# Patient Record
Sex: FEMALE | Race: WHITE | NOT HISPANIC OR LATINO | Employment: FULL TIME | URBAN - METROPOLITAN AREA
[De-identification: names, ages, dates, MRNs, and addresses within clinical notes are randomized per-mention and may not be internally consistent; named-entity substitution may affect disease eponyms.]

---

## 2017-01-03 ENCOUNTER — TRANSCRIBE ORDERS (OUTPATIENT)
Dept: LAB | Facility: HOSPITAL | Age: 47
End: 2017-01-03

## 2017-01-03 ENCOUNTER — APPOINTMENT (OUTPATIENT)
Dept: LAB | Facility: HOSPITAL | Age: 47
End: 2017-01-03
Payer: COMMERCIAL

## 2017-01-03 DIAGNOSIS — Z11.9 SCREENING EXAMINATION FOR UNSPECIFIED INFECTIOUS DISEASE: ICD-10-CM

## 2017-01-03 DIAGNOSIS — Z11.9 SCREENING EXAMINATION FOR UNSPECIFIED INFECTIOUS DISEASE: Primary | ICD-10-CM

## 2017-01-03 LAB — HCV AB SER QL: NORMAL

## 2017-01-03 PROCEDURE — 86803 HEPATITIS C AB TEST: CPT

## 2017-01-03 PROCEDURE — 87491 CHLMYD TRACH DNA AMP PROBE: CPT

## 2017-01-03 PROCEDURE — 86592 SYPHILIS TEST NON-TREP QUAL: CPT

## 2017-01-03 PROCEDURE — 87389 HIV-1 AG W/HIV-1&-2 AB AG IA: CPT

## 2017-01-03 PROCEDURE — 36415 COLL VENOUS BLD VENIPUNCTURE: CPT

## 2017-01-03 PROCEDURE — 87591 N.GONORRHOEAE DNA AMP PROB: CPT

## 2017-01-04 LAB
CHLAMYDIA DNA CVX QL NAA+PROBE: NORMAL
HIV 1+2 AB+HIV1 P24 AG SERPL QL IA: NORMAL
N GONORRHOEA DNA GENITAL QL NAA+PROBE: NORMAL
RPR SER QL: NORMAL

## 2017-03-28 ENCOUNTER — HOSPITAL ENCOUNTER (OUTPATIENT)
Dept: RADIOLOGY | Age: 47
Discharge: HOME | End: 2017-03-28
Attending: OBSTETRICS & GYNECOLOGY
Payer: COMMERCIAL

## 2017-03-28 DIAGNOSIS — Z12.31 ENCOUNTER FOR SCREENING MAMMOGRAM FOR MALIGNANT NEOPLASM OF BREAST: ICD-10-CM

## 2017-03-28 PROCEDURE — G0202 SCR MAMMO BI INCL CAD: HCPCS

## 2017-04-04 ENCOUNTER — HOSPITAL ENCOUNTER (OUTPATIENT)
Dept: MAMMOGRAPHY | Facility: CLINIC | Age: 47
Discharge: HOME | End: 2017-04-04
Payer: COMMERCIAL

## 2017-04-04 ENCOUNTER — ALLSCRIPTS OFFICE VISIT (OUTPATIENT)
Dept: OTHER | Facility: OTHER | Age: 47
End: 2017-04-04

## 2017-04-04 ENCOUNTER — HOSPITAL ENCOUNTER (OUTPATIENT)
Dept: ULTRASOUND IMAGING | Facility: CLINIC | Age: 47
Discharge: HOME | End: 2017-04-04
Payer: COMMERCIAL

## 2017-04-04 DIAGNOSIS — R92.8 OTHER ABNORMAL AND INCONCLUSIVE FINDINGS ON DIAGNOSTIC IMAGING OF BREAST: ICD-10-CM

## 2017-04-04 PROCEDURE — G0206 DX MAMMO INCL CAD UNI: HCPCS

## 2017-04-25 ENCOUNTER — ALLSCRIPTS OFFICE VISIT (OUTPATIENT)
Dept: OTHER | Facility: OTHER | Age: 47
End: 2017-04-25

## 2017-05-11 ENCOUNTER — ALLSCRIPTS OFFICE VISIT (OUTPATIENT)
Dept: OTHER | Facility: OTHER | Age: 47
End: 2017-05-11

## 2017-05-23 ENCOUNTER — ALLSCRIPTS OFFICE VISIT (OUTPATIENT)
Dept: OTHER | Facility: OTHER | Age: 47
End: 2017-05-23

## 2017-06-13 ENCOUNTER — ALLSCRIPTS OFFICE VISIT (OUTPATIENT)
Dept: OTHER | Facility: OTHER | Age: 47
End: 2017-06-13

## 2017-06-19 ENCOUNTER — ALLSCRIPTS OFFICE VISIT (OUTPATIENT)
Dept: OTHER | Facility: OTHER | Age: 47
End: 2017-06-19

## 2017-07-11 ENCOUNTER — ALLSCRIPTS OFFICE VISIT (OUTPATIENT)
Dept: OTHER | Facility: OTHER | Age: 47
End: 2017-07-11

## 2017-08-03 ENCOUNTER — ALLSCRIPTS OFFICE VISIT (OUTPATIENT)
Dept: OTHER | Facility: OTHER | Age: 47
End: 2017-08-03

## 2017-08-21 ENCOUNTER — ALLSCRIPTS OFFICE VISIT (OUTPATIENT)
Dept: OTHER | Facility: OTHER | Age: 47
End: 2017-08-21

## 2017-09-11 ENCOUNTER — ALLSCRIPTS OFFICE VISIT (OUTPATIENT)
Dept: OTHER | Facility: OTHER | Age: 47
End: 2017-09-11

## 2017-09-25 ENCOUNTER — ALLSCRIPTS OFFICE VISIT (OUTPATIENT)
Dept: OTHER | Facility: OTHER | Age: 47
End: 2017-09-25

## 2017-10-09 ENCOUNTER — ALLSCRIPTS OFFICE VISIT (OUTPATIENT)
Dept: OTHER | Facility: OTHER | Age: 47
End: 2017-10-09

## 2017-10-23 ENCOUNTER — ALLSCRIPTS OFFICE VISIT (OUTPATIENT)
Dept: OTHER | Facility: OTHER | Age: 47
End: 2017-10-23

## 2017-11-06 ENCOUNTER — ALLSCRIPTS OFFICE VISIT (OUTPATIENT)
Dept: OTHER | Facility: OTHER | Age: 47
End: 2017-11-06

## 2017-11-14 ENCOUNTER — ALLSCRIPTS OFFICE VISIT (OUTPATIENT)
Dept: OTHER | Facility: OTHER | Age: 47
End: 2017-11-14

## 2017-11-15 NOTE — PROGRESS NOTES
Assessment    1  Encounter for routine gynecological examination (V72 31) (Z01 419)   2  Visit for screening mammogram (V76 12) (Z12 31)    Plan  Encounter for routine gynecological examination    · Follow-up visit in 1 year Evaluation and Treatment  Follow-up  Status: Hold For -Scheduling  Requested for: [id number]   Ordered; For: Encounter for routine gynecological examination; Ordered By: Gerber Lugo Performed:  Due: [id number]  Herpes simplex infection    · ValACYclovir HCl - 500 MG Oral Tablet; TAKE 1 TABLET DAILY   Rx By: Gerber Lugo; Dispense: 90 Days ; #:90 Tablet; Refill: 3;Herpes simplex infection; TAYLOR = N; Verified Transmission to [facility]); Last Updated By: System, [employer]; 11/14/2017 11:15:23 AM  Visit for screening mammogram    · * MAMMO SCREENING BILATERAL W CAD; Status:Hold For - Scheduling,ExactDate,Retrospective By Protocol Authorization; Requested for:Mar 2018; Perform:[facility]; HUK:[id number]; Last Updated Brandon Mcbride; 11/14/2017 11:13:42 AM;Ordered;for screening mammogram; Ordered By:Pramod Fleming; Discussion/Summary  healthy adult female Currently, she eats a healthy diet  cervical cancer screening is current Breast cancer screening: monthly self breast exam was advised, mammogram is current and mammogram has been ordered  Advice and education were given regarding nutrition, aerobic exercise, calcium supplements, vitamin D supplements and sunscreen use  Normal GYN Exam  Stressed  ordered    Rx for Valtrex given for a year to continue the suppressive therapy  Smear Deferred  GYN Exam in 1 year  if any problems develop during the interim  The patient has the current Goals: 1914 [address]  The patent has the current Barriers: None  Patient is able to Self-Care  PATIENT EDUCATION RECORD She was given the following educational materials:  Ideas for a Healthy Life Style  The treatment plan was reviewed with the patient/guardian   The patient/guardian understands and agrees with the treatment plan     Self Referrals: No      Chief Complaint  ANNUAL EXAMhas no problem or concerns      History of Present Illness  HPI: Patient is s/p Hysterectomy    Doing well    No serious Vasomotor symptoms  bowel and bladder habits  any pelvic or abdominal pain    Only an occasional HSV outbreak  suppressive Rx    GYN HM, Adult Female [facility]: The patient is being seen for a gynecology evaluation  The last health maintenance visit was 1 year(s) ago  General Health: The patient's health since the last visit is described as good  She has regular dental visits  -- She denies vision problems  -- She denies hearing loss  Lifestyle:  She consumes a diverse and healthy diet  -- She does not have any weight concerns  -- She exercises regularly  -- She does not use tobacco -- She denies drug use  Reproductive health: the patient is perimenopausal    Screening: cancer screening reviewed and current  metabolic screening reviewed and current  risk screening reviewed and current  Review of Systems   Constitutional: No fever, no chills, feels well, no tiredness, no recent weight gain or loss  ENT: no ear ache, no loss of hearing, no nosebleeds or nasal discharge, no sore throat or hoarseness  Cardiovascular: no complaints of slow or fast heart rate, no chest pain, no palpitations, no leg claudication or lower extremity edema  Respiratory: no complaints of shortness of breath, no wheezing, no dyspnea on exertion, no orthopnea or PND  Breasts: no complaints of breast pain, breast lump or nipple discharge  Gastrointestinal: no complaints of abdominal pain, no constipation, no nausea or diarrhea, no vomiting, no bloody stools  Genitourinary: no complaints of dysuria, no incontinence, no pelvic pain, no dysmenorrhea, no vaginal discharge or abnormal vaginal bleeding    Musculoskeletal: no complaints of arthralgia, no myalgia, no joint swelling or stiffness, no limb pain or swelling  Integumentary: no complaints of skin rash or lesion, no itching or dry skin, no skin wounds  Neurological: no complaints of headache, no confusion, no numbness or tingling, no dizziness or fainting  Active Problems    1  Abnormal mammogram (793 80) (R92 8)   2  Adhesive capsulitis of right shoulder (726 0) (M75 01)   3  Depression, major, recurrent, moderate (296 32) (F33 1)   4  Encounter for routine gynecological examination (V72 31) (Z01 419)   5  Herpes simplex infection (054 9) (B00 9)   6  Pelvic pain in female (625 9) (R10 2)   7  Smear, vaginal, as part of routine gynecological examination (V76 47) (Z12 72)   8   Visit for screening mammogram (V76 12) (Z12 31)    Past Medical History     · History of Encounter for routine gynecological examination (V72 31) (Z01 419)   · History of Encounter for routine gynecological examination (V72 31) (Z01 419)   · History of Headache (784 0) (R51)   · History of Smear, vaginal, as part of routine gynecological examination (V76 47) (Z12 72)   · History of Uterine fibroid (218 9) (D25 9)   · History of Visit for screening mammogram (V76 12) (Z12 31)   · History of Visit for screening mammogram (V76 12) (Z12 31)    Surgical History   · History of Oral Surgery Tooth Extraction   · History of Salpingo-oophorectomy Right Side   · History of Tonsillectomy   · History of Total Abdominal Hysterectomy    Family History  Father    · Family history of Heart disease   · History of heart artery stent   · Family history of Pacemaker Placement  Paternal Grandmother    · Family history of Diabetes  Paternal Grandfather    · Family history of Heart disease  Maternal Great Grandfather    · Family history of Diabetes  Family History    · Family history of Diabetes   · Family history of Gallbladder disease   · Family history of Headache   · Family history of Hypertension   · Family history of Thyroid disease    Social History     · Feels safe at home   · Never a smoker   · Remote social alcohol use    Current Meds   1  Flonase SUSP; Therapy: (Recorded:14Nov2017) to Recorded   2  ValACYclovir HCl - 500 MG Oral Tablet; TAKE 1 TABLET DAILY; Therapy: [id number] to (Srikanth White)  Requested for: 31DQJ6496; Last Rx:08Nov2016 Ordered   3  Vitamin D TABS; Therapy: (Recorded:14Nov2017) to Recorded   4  ZyrTEC Allergy CAPS; Therapy: (Recorded:14Nov2017) to Recorded    Allergies  1  Bactrim TABS  2  No Known Environmental Allergies   3  No Known Food Allergies    Vitals   Recorded: 96ADP5612 88:22GC   Systolic 515   Diastolic 70   Height 5 ft 4 in   Weight 143 lb    BMI Calculated 24 55   BSA Calculated 1 7   LMP JAMIE       Physical Exam   Constitutional  General appearance: No acute distress, well appearing and well nourished  Neck  Neck: Normal, supple, trachea midline, no masses  Thyroid: Normal, no thyromegaly  Pulmonary  Respiratory effort: No increased work of breathing or signs of respiratory distress  Auscultation of lungs: Clear to auscultation  Cardiovascular  Auscultation of heart: Normal rate and rhythm, normal S1 and S2, no murmurs  Peripheral vascular exam: Normal pulses Throughout  Genitourinary  External genitalia: Normal and no lesions appreciated  Vagina: Normal, no lesions or dryness appreciated  Urethra: Normal    Urethral meatus: Normal    Bladder: Normal, soft, non-tender and no prolapse or masses appreciated  Cervix: Surgically absent  Uterus: Surgically absent  Adnexa/parametria: Normal, non-tender and no fullness or masses appreciated  Anus, perineum, and rectum: Normal sphincter tone, no masses, and no prolapse  Chest  Breasts: Normal and no dimpling or skin changes noted  Abdomen  Abdomen: Normal, non-tender, and no organomegaly noted  Liver and spleen: No hepatomegaly or splenomegaly  Examination for hernias: No hernias appreciated  Stool sample for occult blood: Negative     Lymphatic  Palpation of lymph nodes in neck, axillae, groin and/or other locations: No lymphadenopathy or masses noted  Skin  Skin and subcutaneous tissue: Normal skin turgor and no rashes     Palpation of skin and subcutaneous tissue: Normal    Psychiatric  Orientation to person, place, and time: Normal    Mood and affect: Normal        Provider Comments  Provider Comments:   Patient [facility] [place] [facility] at [facility]    5 nieces and nephewsat a Dental practice for 10 years in [facility] Appointments    Date/Time Provider Specialty Site   11/20/2017 08:00 AM Issa Aviles       Signatures   Electronically signed by : JUMANA Tobin ; Nov 14 2017 12:27PM EST                       (Author)

## 2017-11-20 ENCOUNTER — ALLSCRIPTS OFFICE VISIT (OUTPATIENT)
Dept: OTHER | Facility: OTHER | Age: 47
End: 2017-11-20

## 2017-12-18 ENCOUNTER — ALLSCRIPTS OFFICE VISIT (OUTPATIENT)
Dept: OTHER | Facility: OTHER | Age: 47
End: 2017-12-18

## 2018-01-04 ENCOUNTER — ALLSCRIPTS OFFICE VISIT (OUTPATIENT)
Dept: OTHER | Facility: OTHER | Age: 48
End: 2018-01-04

## 2018-01-09 NOTE — PSYCH
Progress Note  Psychotherapy Provided [facility]: Individual Psychotherapy 45 minutes provided today  Goals addressed in session:   Goals: 1  Linda presented as tearful today  She stated that it is her birthday and she is sad regarding the circumstances of her relationship  PT processing her emotions and trying to focus on the positives in her life  Discussing ways to cope with the status of her relationship and her boyfriend's ongoing silence  PT had considered trying to go and see him but has changed her mind  PT expressing her anger towards him for his lack of communication and willingness to try to resolve the issues  Encouraging her to continue to process her emotions  Giving supportive therapy  A- Progress - Continuing to process her emotions  P- Continue treatment       Pain Scale and Suicide Risk [facility]: On a scale of 0 to 10, the patient rates current pain at 3   Current suicide risk is low   Assessment    1   Depression, major, recurrent, moderate (296 32) (F33 1)    Signatures   Electronically signed by : Ирина Chiu LCSW; Jan 12 2016 11:41AM EST                       (Author)

## 2018-01-10 NOTE — PSYCH
Progress Note  Psychotherapy Provided [facility]: Individual Psychotherapy 45 minutes provided today  Goals addressed in session:   Goals: 3  D- Linda stated that she has been feeling worried about her new relationship  She shared that she feels that it is going well and she worries about something going wrong  Processing her emotions and discussing her specific concerns  Discussing the importance of communication and problem solving in relationships  Also continuing to work on building and maintaining boundaries in familial relationships  Giving supportive therapy  A- Progress - COntinuing to process her emotions  P- COntinue treatment       Pain Scale and Suicide Risk  [facility]: Current Pain Assessment: no pain   On a scale of 0 to 10, the patient rates current pain at 0   Behavioral Health Treatment Plan [facility]: Diagnosis and Treatment Plan explained to patient, patient relates understanding diagnosis and is agreeable to Treatment Plan  Assessment    1   Depression, major, recurrent, moderate (296 32) (F33 1)    Signatures   Electronically signed by : Jesusita Diaz LCSW; Nov 20 2017  1:25PM EST                       (Author)

## 2018-01-10 NOTE — PSYCH
Progress Note  Psychotherapy Provided [facility]: Individual Psychotherapy 45 minutes provided today  Goals addressed in session:   Goals: 3  D- Linda stated that she has been feeling very upbeat and positive  She shared that she has joined [employer] to start the dating process  She also shared accomplishments that she has made at work  Continuing to work on remaining positive and making choices that make her happy  Also continuing to process issues in her past that continue to affect her  Giving supportive therapy  A- Progress - Continuing to process her emotions and working on building self esteem   P- Continue treatment       Pain Scale and Suicide Risk [facility]: Current Pain Assessment: no pain   On a scale of 0 to 10, the patient rates current pain at 0   Behavioral Health Treatment Plan [facility]: Diagnosis and Treatment Plan explained to patient, patient relates understanding diagnosis and is agreeable to Treatment Plan  Assessment    1   Depression, major, recurrent, moderate (296 32) (F33 1)    Signatures   Electronically signed by : Prince Kiran LCSW; Nov 8 2016 12:48PM EST                       (Author)

## 2018-01-10 NOTE — PSYCH
Progress Note  Psychotherapy Provided [facility]: Individual Psychotherapy 45 minutes provided today  Goals addressed in session:   Goals: 2  D- Linda stated that she continues to struggle with her families behavior  She stated that they continue to interact with her ex-boyfriend and have been nasty towards her  Processing her emotions and discussing ways for her to communciate her emotions to them  Also discussing ways for her to maintain healthy boundaries Giving supportive therapy  A- progress - Continuing to process her emotions  P- Continue treatment       Pain Scale and Suicide Risk [facility]: Current Pain Assessment: no pain   On a scale of 0 to 10, the patient rates current pain at 0   Behavioral Health Treatment Plan [facility]: Diagnosis and Treatment Plan explained to patient, patient relates understanding diagnosis and is agreeable to Treatment Plan  Assessment    1   Depression, major, recurrent, moderate (296 32) (F33 1)    Signatures   Electronically signed by : Ирина Chiu LCSW; Apr 25 2017  9:57AM EST                       (Author)

## 2018-01-10 NOTE — PSYCH
Progress Note  Psychotherapy Provided [facility]: Individual Psychotherapy 45 minutes provided today  Goals addressed in session:   Goals: 2  D- Linda presented as upset and tearful  She stated that her father is having open heart surgery tomorrow and that she is having a difficult time coping with this  Processing her emotions and discussing ways for her to cope with the situation  ALSo discussing thoughts and feelings that she is experiencing and ways for her to process all of this  Giving supportive therapy  A- Progress - Continuing to process her emotions  P-Continue treatment       Pain Scale and Suicide Risk [facility]: Current Pain Assessment: no pain   On a scale of 0 to 10, the patient rates current pain at 0   Behavioral Health Treatment Plan [facility]: Diagnosis and Treatment Plan explained to patient, patient relates understanding diagnosis and is agreeable to Treatment Plan  Assessment    1   Depression, major, recurrent, moderate (296 32) (F33 1)    Signatures   Electronically signed by : Rebeka Nazario LCSW; Sep 11 2017  9:50AM EST                       (Author)

## 2018-01-10 NOTE — PSYCH
Treatment Plan Tracking        #3 Treatment Plan not completed within required time limits due to: Client presented with emotional/behavioral issues that required clinical intervention        Signatures   Electronically signed by : Romy Skinner LCSW; Nov 20 2017  1:26PM EST                       (Author)

## 2018-01-11 NOTE — PSYCH
Progress Note  Psychotherapy Provided [facility]: Individual Psychotherapy 45 minutes provided today  Goals addressed in session:   Goals: 3  D- Linda stated that she continues to struggle in her relationships with her family members  She also stated that she is ending the relationship with the man that she has been dating die to his narcissistic behaviors  Processing her emotions and discussing ways to communicate her feelings to her family members  ALso discussing ways to seek and maintain healthy relationships in her life  Giving supportive therapy  A- Progress - Continuing to work on maintaining healthy relationships  P-Continue treatment       Pain Scale and Suicide Risk [facility]: Current Pain Assessment: no pain   On a scale of 0 to 10, the patient rates current pain at 0   Behavioral Health Treatment Plan [employer]: Diagnosis and Treatment Plan explained to patient, patient relates understanding diagnosis and is agreeable to Treatment Plan  Assessment    1   Depression, major, recurrent, moderate (296 32) (F33 1)    Signatures   Electronically signed by : Antonia Zaldivar LCSW; May 23 2017 12:42PM EST                       (Author)

## 2018-01-11 NOTE — PSYCH
Treatment Plan Tracking        #3 Treatment Plan not completed within required time limits due to: Client presented with emotional/behavioral issues that required clinical intervention        Signatures   Electronically signed by : Sisi Dawson LCSW; Sep 11 2017  9:50AM EST                       (Author)

## 2018-01-11 NOTE — PSYCH
Treatment Plan Tracking        #3 Treatment Plan not completed within required time limits due to: Client presented with emotional/behavioral issues that required clinical intervention        Signatures   Electronically signed by : Ileana Scott LCSW; Oct  9 2017  2:41PM EST                       (Author)

## 2018-01-11 NOTE — PSYCH
Progress Note  Psychotherapy Provided [facility]: Individual Psychotherapy 45 minutes provided today  Goals addressed in session:   Goals: 3  D- Linda stated that she continues to struggle in her relationships with her family members  She stated that her sisters continue to act sarcastic towards her and question her decisions and life choices  In addition, they continue to spend time with her ex-boyfriend and make comments regarding this  Processing her emotions and continuing to discuss ways to set effective boundaries as well as healthy ways to respond to their comments  Giving supportive therapy  A- progress - Continuing to process her emotions  P- Continue treatment       Pain Scale and Suicide Risk [facility]: Current Pain Assessment: no pain   On a scale of 0 to 10, the patient rates current pain at 0   Behavioral Health Treatment Plan [facility]: Diagnosis and Treatment Plan explained to patient, patient relates understanding diagnosis and is agreeable to Treatment Plan  Assessment    1   Depression, major, recurrent, moderate (296 32) (F33 1)    Signatures   Electronically signed by : Arpan Caban LCSW; May 16 2017  9:58AM EST                       (Author)

## 2018-01-11 NOTE — PSYCH
Treatment Plan Tracking        #3 Treatment Plan not completed within required time limits due to: Client presented with emotional/behavioral issues that required clinical intervention        Signatures   Electronically signed by : Emmett Franklin LCSW; May 23 2017 12:43PM EST                       (Author)

## 2018-01-11 NOTE — PSYCH
Treatment Plan Tracking        #3 Treatment Plan not completed within required time limits due to: Client presented with emotional/behavioral issues that required clinical intervention        Signatures   Electronically signed by : Shweta Pompa LCSW; Sep 25 2017  9:55AM EST                       (Author)

## 2018-01-11 NOTE — PSYCH
Progress Note  Psychotherapy Provided [facility]: Individual Psychotherapy 45 minutes provided today  Goals addressed in session:   Goals: 3  D- Linda stated that she just met someone new that she is interested in having a relationship with  Discussing her relationship goals and what she feels would be healthy about this potential relationship  Also discussing her family and the boundaries that she feels are healthy with them and her ex-boyfriend  Giving supportive therapy  A- PRogress - Continuing to process her emotions  P-Continue treatment       Pain Scale and Suicide Risk [facility]: Current Pain Assessment: no pain   On a scale of 0 to 10, the patient rates current pain at 0   Behavioral Health Treatment Plan [facility]: Diagnosis and Treatment Plan explained to patient, patient relates understanding diagnosis and is agreeable to Treatment Plan  Assessment    1   Depression, major, recurrent, moderate (296 32) (F33 1)    Signatures   Electronically signed by : Zheng Henao LCSW; Oct 23 2017  4:46PM EST                       (Author)

## 2018-01-11 NOTE — PSYCH
Progress Note  Psychotherapy Provided [facility]: Individual Psychotherapy 45 minutes provided today  Goals addressed in session:   Goals: 1  D- Linda stated that she is feeling better this week  She celebrated her birthday over the weekend with her family  She shared that her boyfriend did text her briefly for her birthday, but she had no further contact  PT continuing to process her emotions regarding the relationship  Discussing her feelings as well as concerns regarding her boyfriends depression and anxiety  Continuing to work on effective coping and self care  PT continues to utilize self help books as well as her support system  Giving supportive therapy  A- Progress - Continuing to utilize her support system  P- Continue treatment       Pain Scale and Suicide Risk [facility]: On a scale of 0 to 10, the patient rates current pain at 3   Current suicide risk is low   Behavioral Health Treatment Plan [facility]: Diagnosis and Treatment Plan explained to patient, patient relates understanding diagnosis and is agreeable to Treatment Plan  Assessment    1   Depression, major, recurrent, moderate (296 32) (F33 1)    Signatures   Electronically signed by : Rebeka Nazario LCSW; Jan 19 2016 10:19AM EST                       (Author)

## 2018-01-11 NOTE — PSYCH
Progress Note  Psychotherapy Provided [facility]: Individual Psychotherapy 45 minutes provided today  Goals addressed in session:   Goals: 1  D- Linda stated that she has onelia dealing with a lot of emotions  Processing her emotions and discussing the source  PT continuing to process the breakup with her boyfriend  She also stated that her fibromyalgia pain has increased  PT stated that she continues to utilize spirituality and prayer to cope as well as her support system  Giving supportive therapy  A- Progress - Continuing to process her emotions  P- COntinue treatment       Pain Scale and Suicide Risk [facility]: Current Pain Assessment: moderate to severe   On a scale of 0 to 10, the patient rates current pain at 4   Current suicide risk is low   Behavioral Health Treatment Plan [address] [place] [address] [address]: Diagnosis and Treatment Plan explained to patient, patient relates understanding diagnosis and is agreeable to Treatment Plan  Assessment    1   Depression, major, recurrent, moderate (296 32) (F33 1)    Signatures   Electronically signed by : Alexey Terrell LCSW; Mar 31 2016  9:22AM EST                       (Author)

## 2018-01-12 NOTE — PSYCH
Progress Note  Psychotherapy Provided [facility]: Individual Psychotherapy 45 minutes provided today  Goals addressed in session:   Goals: 2 & 3  D- Linda stated that she continues to struggle with the fact that she has had difficulty finding healthy relationships  In addition, she discussed continued issues with her family members  Processing her emotions and continuing to work on boundary setting in relationships as well as building trust  Giving supportive therpay  A- PRogress - Continuing to process her emotions  'P-Continue treatment         Pain Scale and Suicide Risk  [facility]: Current Pain Assessment: no pain   Behavioral Health Treatment Plan [facility]: Diagnosis and Treatment Plan explained to patient, patient relates understanding diagnosis and is agreeable to Treatment Plan  Assessment    1   Depression, major, recurrent, moderate (296 32) (F33 1)    Signatures   Electronically signed by : Xiomara Dunn LCSW; Aug  3 2017 10:36AM EST                       (Author)

## 2018-01-12 NOTE — PSYCH
Treatment Plan Tracking        #3 Treatment Plan not completed within required time limits due to: Client presented with emotional/behavioral issues that required clinical intervention        Signatures   Electronically signed by : Romy Skinner LCSW; May 16 2017  9:58AM EST                       (Author)

## 2018-01-12 NOTE — PSYCH
Progress Note  Psychotherapy Provided [facility]: Individual Psychotherapy 45 minutes provided today  Goals addressed in session:   Goals: 3  D- Linda stated that she continues to struggle with her relationships with her family memebers  She shared that she continues to feel judged by them for her life choices  Continuing to work on boundary setting her familial relationships as well as being true to herself with her life choices  Giving supportive therpay  A- PRogress - Continuing to process her emotions  P- Continue treatmetn       Pain Scale and Suicide Risk [facility]: Current Pain Assessment: no pain   On a scale of 0 to 10, the patient rates current pain at 0   Behavioral Health Treatment Plan [facility]: Diagnosis and Treatment Plan explained to patient, patient relates understanding diagnosis and is agreeable to Treatment Plan  Assessment    1   Depression, major, recurrent, moderate (296 32) (F33 1)    Signatures   Electronically signed by : Renny Vazquez LCSW; Aug 22 2017 11:47AM EST                       (Author)

## 2018-01-12 NOTE — PSYCH
Treatment Plan Tracking        #3 Treatment Plan not completed within required time limits due to: Client presented with emotional/behavioral issues that required clinical intervention        Signatures   Electronically signed by : Antonia Zaldivar LCSW; Jun 19 2017 11:52AM EST                       (Author)

## 2018-01-12 NOTE — PSYCH
Progress Note  Psychotherapy Provided [facility]: Individual Psychotherapy 45 minutes provided today  Goals addressed in session:   Goals: 3  D- Linda stated that she recently moved into her new home  She stated that she is very happy there  Discussing boundary issues with her family members  She shared that they are participating in outings with her ex-boyfriend  Processing her emotions and discussing ways for her to express her feelings to them  Linda also discussing stress that she is experiencing in the workplace and ways to decrease her stress level  Giving supportive therapy  A- progress- COntinuing to process her emotions  P- Continue treatment       Pain Scale and Suicide Risk [facility]: Current Pain Assessment: no pain   On a scale of 0 to 10, the patient rates current pain at 0   Behavioral Health Treatment Plan [facility]: Diagnosis and Treatment Plan explained to patient, patient relates understanding diagnosis and is agreeable to Treatment Plan  Assessment    1   Depression, major, recurrent, moderate (296 32) (F33 1)    Signatures   Electronically signed by : Romy Skinner LCSW; Oct  3 2016 11:22AM EST                       (Author)

## 2018-01-12 NOTE — PSYCH
Progress Note  Psychotherapy Provided [facility]: Individual Psychotherapy 45 minutes provided today  Goals addressed in session:   Goals: 2 & 3  D- Linda stated that she continues to struggle with her family's behavior  She stated that they continue to be judgemental of her and her life choices  The also continue to spend time with her ex-boyfriend and side with him on issues  Processing her emotions and discussing ways to set effective boundaries with them  Also discussing her ex-boyfriend's request to meet with her and how to navigate the interaction  Giving supportive therapy  A- Progress - Continuing to work on boundary setting in relationships  P-Continue treatment       Pain Scale and Suicide Risk [facility]: Current Pain Assessment: no pain   On a scale of 0 to 10, the patient rates current pain at 0   Behavioral Health Treatment Plan [facility]: Diagnosis and Treatment Plan explained to patient, patient relates understanding diagnosis and is agreeable to Treatment Plan  Assessment    1   Depression, major, recurrent, moderate (296 32) (F33 1)    Signatures   Electronically signed by : Ирина Chiu LCSW; Jun 19 2017 11:52AM EST                       (Author)

## 2018-01-12 NOTE — PSYCH
Progress Note  Psychotherapy Provided [facility]: Individual Psychotherapy 45 minutes provided today  Goals addressed in session:   Goals: 2  DSerafin Mckeon stated that she continues to struggle with her relationships with her family and her ex-boyfriend's continued interference in her relationships with her family members  Processing her emotions and discussing ways for her to communicate her feelings to her family as well as set boundaries  ALso discussing ways to respond to the texts from her ex-boyfriend regarding this matter  Giving supportive therapy  A- PRogress - Continuing to process her emotions  P - Continue treatment       Pain Scale and Suicide Risk [facility]: Current Pain Assessment: no pain   On a scale of 0 to 10, the patient rates current pain at 0   Behavioral Health Treatment Plan [facility]: Diagnosis and Treatment Plan explained to patient, patient relates understanding diagnosis and is agreeable to Treatment Plan  Assessment    1   Depression, major, recurrent, moderate (296 32) (F33 1)    Signatures   Electronically signed by : Eder Toney LCSW; Jun 13 2017  5:10PM EST                       (Author)

## 2018-01-12 NOTE — PSYCH
Treatment Plan Tracking        #3 Treatment Plan not completed within required time limits due to: Client presented with emotional/behavioral issues that required clinical intervention        Signatures   Electronically signed by : Ирина Chiu LCSW; Aug 22 2017 11:48AM EST                       (Author)

## 2018-01-13 VITALS
WEIGHT: 143 LBS | HEIGHT: 64 IN | SYSTOLIC BLOOD PRESSURE: 102 MMHG | BODY MASS INDEX: 24.41 KG/M2 | DIASTOLIC BLOOD PRESSURE: 70 MMHG

## 2018-01-13 NOTE — PSYCH
Progress Note  Psychotherapy Provided [facility]: Individual Psychotherapy 45 minutes provided today  Goals addressed in session:   Goals: 1  D- Linda stated that she had communicated via text message with her boyfriend attempting to schedule a time for her to retrieve her things from his house  PT discussing his response and expressing her frustration  Processing her emotions also discussing her response  PT choosing to let go of the relationship due to its unhealthy nature  Continuing to focus on her use of coping mechanisms and positive thought  PT also continuing to focus on her valdez as positive motivation  Giving supportive therapy  A- Progress - Continuing to utilize positive thought  P- Continue treatment       Pain Scale and Suicide Risk [facility]: Current Pain Assessment: no pain   Current suicide risk is low   Behavioral Health Treatment Plan [facility]: Diagnosis and Treatment Plan explained to patient, patient relates understanding diagnosis and is agreeable to Treatment Plan  Assessment    1   Depression, major, recurrent, moderate (296 32) (F33 1)    Signatures   Electronically signed by : Rebeka Nazario LCSW; Mar 17 2016  2:24PM EST                       (Author)

## 2018-01-13 NOTE — PSYCH
Treatment Plan Tracking        #3 Treatment Plan not completed within required time limits due to: Client presented with emotional/behavioral issues that required clinical intervention        Signatures   Electronically signed by : Dion Samayoa LCSW; Apr 25 2017  9:58AM EST                       (Author)

## 2018-01-14 NOTE — PSYCH
Progress Note  Psychotherapy Provided [facility]: Individual Psychotherapy 45 minutes provided today  Goals addressed in session:   Goals: 1  D- Linda stated that there have many recent changes in her life  She shared that she has made the decision to sell her home and move  She also shared that her ex-boyfriend invited her nephews to watch a hockey game  Processing her emotions and discussing her feelings regarding his actions  PT feels that it was an attempt to gain a reaction from her  PT stated that she feels the relationship was unhealthy and that she is ready to move forward with her life  Discussing the positive changes that she has made  Giving supportive therapy  A- Progress - Continuing to process her emotions  P- Continue treatment       Pain Scale and Suicide Risk [facility]: On a scale of 0 to 10, the patient rates current pain at 3   Behavioral Health Treatment Plan [facility]: Diagnosis and Treatment Plan explained to patient, patient relates understanding diagnosis and is agreeable to Treatment Plan  Assessment    1   Depression, major, recurrent, moderate (296 32) (F33 1)    Signatures   Electronically signed by : Sultana Carrasco LCSW; May 19 2016  6:25PM EST                       (Author)

## 2018-01-14 NOTE — PSYCH
Treatment Plan Tracking        #3 Treatment Plan not completed within required time limits due to: Client presented with emotional/behavioral issues that required clinical intervention        Signatures   Electronically signed by : Sisi Dawson LCSW; Oct 23 2017  4:47PM EST                       (Author)

## 2018-01-15 NOTE — PSYCH
Treatment Plan Tracking        #3 Treatment Plan not completed within required time limits due to: Client presented with emotional/behavioral issues that required clinical intervention        Signatures   Electronically signed by : Erwin Lemus LCSW; Aug  3 2017 10:37AM EST                       (Author)

## 2018-01-15 NOTE — PSYCH
Progress Note  Psychotherapy Provided [facility]: Individual Psychotherapy 45 minutes provided today  Goals addressed in session:   Goals: 1  D- Linda stated that she continues to struggle with depression  She shared that her father's surgery went will and he is continuing to recover  She stated that she feels unsettled and isn't sure why  PRocessing her emotions and discussing potential triggers  Linda identifying that her ex-boyfriend's contact with her family is upsetting  Discussing ways for her to cope with this and be able to distance herself from the situation  Giving supportive therapy  A- Progress- Continuing to process her emotions  P-Continue treatment       Pain Scale and Suicide Risk [facility]: Current Pain Assessment: no pain   On a scale of 0 to 10, the patient rates current pain at 0   Behavioral Health Treatment Plan [facility]: Diagnosis and Treatment Plan explained to patient, patient relates understanding diagnosis and is agreeable to Treatment Plan  Assessment    1   Depression, major, recurrent, moderate (296 32) (F33 1)    Signatures   Electronically signed by : Polo Al LCSW; Sep 25 2017  9:55AM EST                       (Author)

## 2018-01-15 NOTE — PSYCH
Progress Note  Psychotherapy Provided [facility]: Individual Psychotherapy 45 minutes provided today  Goals addressed in session:   Goals: 1, 2 & 3  D- Linda stated that she feels that she is doing very well  She stated that she continues to search for a new home and has recovered well from her shoulder surgery  Discussing her healing journey from her relationship  Focusing on her ability to not be reactive to situations as well as to embrace who she is  Reviewing and revising treatment plan  Giving supportive therapy  A- Progress - Continuing to process her emotions  P- Continue treatment       Pain Scale and Suicide Risk [facility]: On a scale of 0 to 10, the patient rates current pain at 1   Behavioral Health Treatment Plan [facility]: Diagnosis and Treatment Plan explained to patient, patient relates understanding diagnosis and is agreeable to Treatment Plan  Assessment    1   Depression, major, recurrent, moderate (296 32) (F33 1)    Signatures   Electronically signed by : Polo Al LCSW; Jul 14 2016 10:08AM EST                       (Author)

## 2018-01-15 NOTE — PSYCH
Progress Note  Psychotherapy Provided [facility]: Individual Psychotherapy 45 minutes provided today  Goals addressed in session:   Goals: 2 & 3  D- Linda returned to treatment after a four month hiatus  She stated that she had been doing well, but is now feeling overwhelmed by issues with her family and ex-boyfriend  Discussing the issues and ways to set boundaries with her family  Also discussing ways to set boundaries with her ex-boyfriend  Linda also stated that they found an abnormality on her mammogram which will be followed up on today causing additional stress for her  Giving supportive therapy  A- progress - Continuing to process her emotions  P- Continue treatment       Pain Scale and Suicide Risk [facility]: Current Pain Assessment: no pain   On a scale of 0 to 10, the patient rates current pain at 0   Behavioral Health Treatment Plan [facility]: Diagnosis and Treatment Plan explained to patient, patient relates understanding diagnosis and is agreeable to Treatment Plan  Assessment    1   Depression, major, recurrent, moderate (296 32) (F33 1)    Signatures   Electronically signed by : Dion Samayoa LCSW; Apr 4 2017  9:42AM EST                       (Author)

## 2018-01-15 NOTE — PSYCH
Treatment Plan Tracking        #3 Treatment Plan not completed within required time limits due to: Client presented with emotional/behavioral issues that required clinical intervention        Signatures   Electronically signed by : Erwin Lemus LCSW; Jun 13 2017  5:11PM EST                       (Author)

## 2018-01-15 NOTE — PSYCH
Progress Note  Psychotherapy Provided [facility]: Individual Psychotherapy 45 minutes provided today  Goals addressed in session:   Goals: 2 & 3  d- Linda stated that she continues to do well  she shared that she continues to work on having patience in all situations in her life  she shared that she continues to look for houses and is enjoying the process  She further shared that her ex-boyfriend sent gifts to her nephew for his birthday  Processing her emotions regarding this and discussing how it affected her  Continuing to work on moving forward with her life  Giving supportive therapy  A- progress- Continuing to process her emotions  P- Continue treatment       Pain Scale and Suicide Risk [facility]: Current Pain Assessment: no pain   On a scale of 0 to 10, the patient rates current pain at 0   Behavioral Health Treatment Plan [facility]: Diagnosis and Treatment Plan explained to patient, patient relates understanding diagnosis and is agreeable to Treatment Plan  Assessment    1   Depression, major, recurrent, moderate (296 32) (F33 1)    Signatures   Electronically signed by : Romy Skinner LCSW; Aug  4 2016  3:14PM EST                       (Author)

## 2018-01-16 NOTE — PSYCH
Progress Note  Psychotherapy Provided [facility]: Individual Psychotherapy 45 minutes provided today  Goals addressed in session:   Goals: 3  D- Linda stated that she continues to struggle in her relationships with her family members due to their judgment of her as well as their continued contact with her ex-boyfriend  Processing her emotions and discussing ways for her to st and maintain healthy boundaries with her family members  Also discussing her re-entrance into the dating world and discussing what she is seeking in a relationship  Giving supportive therapy  A- progress - COntinuing to process her emotions  P- COntinue treatment       Pain Scale and Suicide Risk [facility]: Current Pain Assessment: no pain   On a scale of 0 to 10, the patient rates current pain at 0   Behavioral Health Treatment Plan [facility]: Diagnosis and Treatment Plan explained to patient, patient relates understanding diagnosis and is agreeable to Treatment Plan  Assessment    1   Depression, major, recurrent, moderate (296 32) (F33 1)    Signatures   Electronically signed by : Zheng Henao LCSW; Oct  9 2017  2:40PM EST                       (Author)

## 2018-01-16 NOTE — PSYCH
Progress Note  Psychotherapy Provided [facility]: Individual Psychotherapy 45 minutes provided today  Goals addressed in session:   Goals: 1  D- Linda stated that she continues to do well  Stated that she still has difficult days at times when she misses her boyfriend  PT stated that she continues to rely on her valdez as well as her support system  Continuing to work on use of positive thought and increasing communication in relationships  PT also stated that she continues to follow the weight watchers plan as well as doing yoga as a form of self care  Giving positive feedback and supportive therapy  A- Progress - Continuing to increase self care  P- Continue treatment       Pain Scale and Suicide Risk [facility]: Current Pain Assessment: no pain   Current suicide risk is low   Behavioral Health Treatment Plan [facility]: Diagnosis and Treatment Plan explained to patient, patient relates understanding diagnosis and is agreeable to Treatment Plan  Assessment    1   Depression, major, recurrent, moderate (296 32) (F33 1)    Signatures   Electronically signed by : Camden Nguyen LCSW; Apr 21 2016 11:37AM EST                       (Author)

## 2018-01-16 NOTE — PSYCH
Date of Initial Treatment Plan: 2/2015  Date of Current Treatment Plan: 7/14/16  Strengths/Personal Resources for Self Care: Embracing my valdez, strong, good decision making, healthy relationships, looking at the big picture  Diagnosis:   Axis I: MDD   Axis II: Deferred   Axis III: Shoulder issues     Current Challenges/Problems/Needs: Letting anxiety take over  Being reactive  Not being what other people want  Long Term Goals:   Be able to roll with things   Target Date: 11/14/16      Not be reactive   Target Date: 11/14/16      Be myself and love myself for who I am   Target Date: 11/14/16      Short Term Objectives:   Goal 1:   Count to ten  Sleep on it  Don't make snap decisions  Allow thing to work out  Use patience  Target Date: 11/14/16      Goal 2:   Let things naturally happen  Don't try to orchestrate  Target Date: 11/14/16      Goal 3:   Don't settle for something or someone I don't want in my life  Don't rationalize    Target Date: 11/14/16      GOAL 1: Modality: Individual 2 x per month Target Date: 11/14/16         GOAL 2: Modality: Individual 2 x per month Target Date: 11/14/16                The first scheduled review date is 11/14/16  The expected length of service is Unknown  Patient Signature: _________________________________ Date/Time: ______________        1  Adhesive capsulitis of right shoulder (726 0) (M75 01)   2  Depression, major, recurrent, moderate (296 32) (F33 1)   3  Encounter for routine gynecological examination (V72 31) (Z01 419)   4  Herpes simplex infection (054 9) (B00 9)   5  Pelvic pain in female (625 9) (R10 2)   6  Smear, vaginal, as part of routine gynecological examination (V76 47) (Z12 72)   7   Visit for screening mammogram (P95 31) (Z12 31)     Electronically signed by : Sammy Mitchell LCSW; Jul 14 2016  8:59AM EST                       (Author)

## 2018-01-16 NOTE — PSYCH
Treatment Plan Tracking        #3 Treatment Plan not completed within required time limits due to: Client presented with emotional/behavioral issues that required clinical intervention        Signatures   Electronically signed by : Norah Lilly LCSW; Nov 6 2017  2:21PM EST                       (Author)

## 2018-01-16 NOTE — PSYCH
Progress Note  Psychotherapy Provided [facility]: Individual Psychotherapy 45 minutes provided today  Goals addressed in session:   Goals: 1  D- Linda stated that she feels overwhelmed at the moment due to her impending move  She continues to work towards [employer] of her home and has experienced stress in terms of the repairs that need to be completed  Discussing ways to reduce her stress  PT also continuing to process the loss of her relationship  In addition PT is experiencing pain due to a shoulder injury that she will be repairing through surgery  Giving supportive therapy  A- Progress - Continuing to process her emotions  P- Continue treatment       Pain Scale and Suicide Risk [facility]: On a scale of 0 to 10, the patient rates current pain at 5   Behavioral Health Treatment Plan [facility]: Diagnosis and Treatment Plan explained to patient, patient relates understanding diagnosis and is agreeable to Treatment Plan  Assessment    1   Depression, major, recurrent, moderate (296 32) (F33 1)    Signatures   Electronically signed by : Josh Patton LCSW; Jun 9 2016  8:39AM EST                       (Author)

## 2018-01-16 NOTE — PSYCH
Progress Note  Psychotherapy Provided [facility]: Individual Psychotherapy 45 minutes provided today  Goals addressed in session:   Goals: 3  D- Linda stated that she continues to move forward with her new relationship  Continuing to work on ways to increase and maintain healthy communication  Also continuing to work on ways to maintain healthy boundaries with her family  Giving supportive therapy  A- Progress - Continuing to process her emotions  P-Continue treatment       Pain Scale and Suicide Risk [facility]: Current Pain Assessment: no pain   On a scale of 0 to 10, the patient rates current pain at 0   Behavioral Health Treatment Plan [facility]: Diagnosis and Treatment Plan explained to patient, patient relates understanding diagnosis and is agreeable to Treatment Plan  Assessment    1   Depression, major, recurrent, moderate (296 32) (F33 1)    Signatures   Electronically signed by : Gurpreet Zamarripa LCSW; Nov 6 2017  2:14PM EST                       (Author)

## 2018-01-17 NOTE — PSYCH
Treatment Plan Tracking        #3 Treatment Plan not completed within required time limits due to: Client presented with emotional/behavioral issues that required clinical intervention  , Other: Had not bee in treatment for four months        Signatures   Electronically signed by : Tomasa Richards LCSW; Apr 4 2017  9:44AM EST                       (Author)

## 2018-01-17 NOTE — PSYCH
Progress Note  Psychotherapy Provided [facility]: Individual Psychotherapy 45 minutes provided today  Goals addressed in session:   Goals: 3  D- Linda stated that she has continued to focus on not being reactionary and maintaining peace in her life  She shared that her family went to a football game with her ex-boyfriend yesterday  Processing her emotions and discussing her thoughts and feelings regarding the relationship and his continued involvement with her family  Linda discussing her recent dating experience and ways that she has been maintaining healthy boundaries with her family  Continuing to work on use of positive thought  Giving supportive therapy  A- progress - Continuing to utilize positive thought  P- Continue treatment       Pain Scale and Suicide Risk [facility]: Current Pain Assessment: no pain   On a scale of 0 to 10, the patient rates current pain at 0   Behavioral Health Treatment Plan [facility]: Diagnosis and Treatment Plan explained to patient, patient relates understanding diagnosis and is agreeable to Treatment Plan  Assessment    1   Depression, major, recurrent, moderate (296 32) (F33 1)    Signatures   Electronically signed by : Rebeka Nazario LCSW; Dec 12 2016 10:43AM EST                       (Author)

## 2018-01-17 NOTE — PSYCH
Progress Note  Psychotherapy Provided [facility]: Individual Psychotherapy 45 minutes provided today  Goals addressed in session:   Goals: 1  D- Linda stated that she continues to do well in terms of use of positive thought as well as occupying her time with positive activities  PT continues to process her emotions in terms of her relationship with Mg  She shared that she misses him and still loves him but is wiling to continue to give him the space that he needs in order to get well  PT continues to work on utilizing prayer and spirituality to guide her during this difficult time  Continuing to work on decreasing negative thought and anger regarding past events  Giving supportive therapy  A- Progress - Continuing to process her emotions  P- Continue treatment       Pain Scale and Suicide Risk [facility]: Current Pain Assessment: no pain   Current suicide risk is low   Behavioral Health Treatment Plan [facility]: Diagnosis and Treatment Plan explained to patient, patient relates understanding diagnosis and is agreeable to Treatment Plan  Assessment    1   Depression, major, recurrent, moderate (296 32) (F33 1)    Signatures   Electronically signed by : Marie Wilson LCSW; Feb 5 2016 11:35AM EST                       (Author)
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
## 2018-01-17 NOTE — PSYCH
Progress Note  Psychotherapy Provided [facility]: Individual Psychotherapy 45 minutes provided today  Goals addressed in session:   Goals: 1  D- Linda stated that she continues to try to move forward and be positive in her life  Sharing that she struggled on Virk's Day due to missing her boyfriend  Continuing to process her emotions and discussing ways to be able to move forward  PT feeling that she wants to be able to help him, but is uncertain how to proceed  Discussing maintaining and respecting the boundaries he has set even if she doesn't understand them  Continuing to work on use of effective coping mechanisms  Giving supportive therapy  A- Progress - Continuing to process her emotions  P- Continue treatment       Pain Scale and Suicide Risk [facility]: Current Pain Assessment: no pain   Current suicide risk is low   Behavioral Health Treatment Plan [address] [place] [address] [address]: Diagnosis and Treatment Plan explained to patient, patient relates understanding diagnosis and is agreeable to Treatment Plan  Assessment    1   Depression, major, recurrent, moderate (296 32) (F33 1)    Signatures   Electronically signed by : Mahin Rocha LCSW; Feb 18 2016 11:23AM EST                       (Author)
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
## 2018-01-18 NOTE — PSYCH
Treatment Plan Tracking    #1 Treatment Plan not completed within required time limits due to: Client presented with emotional/behavioral issues that required clinical intervention            Signatures   Electronically signed by : Sultana Carrasco LCSW; Apr 21 2016 11:38AM EST                       (Author)

## 2018-01-22 ENCOUNTER — ALLSCRIPTS OFFICE VISIT (OUTPATIENT)
Dept: OTHER | Facility: OTHER | Age: 48
End: 2018-01-22

## 2018-01-23 NOTE — PSYCH
Treatment Plan Tracking        #3 Treatment Plan not completed within required time limits due to: Client presented with emotional/behavioral issues that required clinical intervention        Signatures   Electronically signed by : Tommy Delcid LCSW; Dec 18 2017 10:00AM EST                       (Author)

## 2018-01-23 NOTE — PSYCH
Progress Note  Psychotherapy Provided [facility]: Individual Psychotherapy 45 minutes provided today  Goals addressed in session:   Goals: 3  D- Linda stated that she is upset and frustrated with her family  She shared that her sister has been communicating regularly with her ex-boyfriend and also recently spent time with him and stated that he would begin attending family functions  Processing her emotions and discussing ways for her to cope with the situation as well as communicate the inappropriate nature of this to her family members  Also discussing forming and maintaining healthy relationships in her life  Giving supportive therapy  A- Progress - Continuing to process her emotions  p-Continue treatment       Pain Scale and Suicide Risk [facility]: Current Pain Assessment: no pain   Behavioral Health Treatment Plan [facility]: Diagnosis and Treatment Plan explained to patient, patient relates understanding diagnosis and is agreeable to Treatment Plan  Assessment    1   Depression, major, recurrent, moderate (296 32) (F33 1)    Signatures   Electronically signed by : Ирина Chiu LCSW; Dec 18 2017 10:00AM EST                       (Author)

## 2018-01-23 NOTE — PSYCH
Treatment Plan Tracking        #3 Treatment Plan not completed within required time limits due to: Client presented with emotional/behavioral issues that required clinical intervention        Signatures   Electronically signed by : Carrillo Hairston LCSW; Jan 4 2018  2:27PM EST                       (Author)

## 2018-01-23 NOTE — PSYCH
Progress Note  Psychotherapy Provided [facility]: Individual Psychotherapy 45 minutes provided today  Goals addressed in session:   Goals: 3  D- Linda stated that she has had a somewhat difficult time over the holiday due to issues with her ex-boyfriend  She shared that he had asked to speak to her regarding their relationship  She shared that she was able to gain closure on several issues within the relationship and feels that it would be unhealthy to reconcile with him  Processing her emotions and discussing ways to build and maintain healthy relationships in her life  Giving supportive therapy  A- Progress - Continuing to process her emotions  P- Continue treatment       Pain Scale and Suicide Risk [facility]: Current Pain Assessment: no pain   On a scale of 0 to 10, the patient rates current pain at 0   Behavioral Health Treatment Plan [facility]: Diagnosis and Treatment Plan explained to patient, patient relates understanding diagnosis and is agreeable to Treatment Plan  Assessment    1   Depression, major, recurrent, moderate (296 32) (F33 1)    Signatures   Electronically signed by : Eder Toney LCSW; Jan 4 2018  2:26PM EST                       (Author)

## 2018-01-24 NOTE — PSYCH
Date of Initial Treatment Plan: 2/2015  Date of Current Treatment Plan: 1/22/18  Strengths/Personal Resources for Self Care: Embracing my valdez, strong, good decision making, healthy relationships, looking at the big picture  Diagnosis:   Axis I: MDD   Axis II: None   Axis III: Good health     Area of Needs: Relationships  Long Term Goals: Form and maintain heathy relationships in my life   Target Date: 5/22/18              Short Term Objectives:   Goal 1:   Maintain healthy boundaries  Communicate what I'm feeling  Continue to be true to myself  Target Date: 5/22/18              GOAL 1: Modality: Individual 1-2 x per month Target Date: 5/22/18       The person(s) responsible for carrying out the plan is Linda  The first scheduled review date is 5/22/18                 Patient Signature: _________________________________ Date/Time: ______________       Electronically signed by : Antonio Duran LCSW; Jan 22 2018 10:04AM EST                       (Author)

## 2018-01-24 NOTE — PSYCH
Progress Note  Psychotherapy Provided [facility]: Individual Psychotherapy 45 minutes provided today  Goals addressed in session:   Goals: 1  D- Linda stated that she has been struggling with dating  She feels that she vacillates towards unhealthy people  Discussing her recent relationships and ways that she has communicated and set boundaries  Discussing her progress and reviewing positive decisions that she has made regarding her relationships  reviewing and revising her treatment plan  Giving supportive therapy  A- Progress - Continuing to process her emotions  P-Continue treatment       Pain Scale and Suicide Risk [facility]: Current Pain Assessment: no pain   On a scale of 0 to 10, the patient rates current pain at 0   Behavioral Health Treatment Plan [facility]: Diagnosis and Treatment Plan explained to patient, patient relates understanding diagnosis and is agreeable to Treatment Plan  Assessment    1   Depression, major, recurrent, moderate (296 32) (F33 1)    Signatures   Electronically signed by : Camden Nguyen LCSW; Jan 22 2018  3:37PM EST                       (Author)

## 2018-02-08 ENCOUNTER — OFFICE VISIT (OUTPATIENT)
Dept: BEHAVIORAL/MENTAL HEALTH CLINIC | Facility: CLINIC | Age: 48
End: 2018-02-08
Payer: COMMERCIAL

## 2018-02-08 DIAGNOSIS — F33.1 MAJOR DEPRESSIVE DISORDER, RECURRENT EPISODE, MODERATE (HCC): Primary | ICD-10-CM

## 2018-02-08 PROCEDURE — 90834 PSYTX W PT 45 MINUTES: CPT | Performed by: SOCIAL WORKER

## 2018-02-08 NOTE — PSYCH
Psychotherapy Provided: Individual Psychotherapy 45 minutes     Length of time in session: 45 minutes, follow up in 3 week    Goals addressed in session: Goal 1     Pain:      none    0    Current suicide risk : Low     D- Linda stated that she continues to fel frustrated at times with her ongoing search to find a relationship  She stated that dating online is difficult and tiresome at times  Discussing ways to navigate the dating world and continue to maintain healthy relationships in her life  Giving supportive therapy  A- Progress - Continuing to process her emotions  P-Continue treatment      [address]: Diagnosis and Treatment Plan explained to Deon Breath relates understanding diagnosis and is agreeable to Treatment Plan   Yes

## 2018-02-27 ENCOUNTER — OFFICE VISIT (OUTPATIENT)
Dept: BEHAVIORAL/MENTAL HEALTH CLINIC | Facility: CLINIC | Age: 48
End: 2018-02-27
Payer: COMMERCIAL

## 2018-02-27 DIAGNOSIS — F33.1 MAJOR DEPRESSIVE DISORDER, RECURRENT EPISODE, MODERATE (HCC): Primary | ICD-10-CM

## 2018-02-27 PROCEDURE — 90834 PSYTX W PT 45 MINUTES: CPT | Performed by: SOCIAL WORKER

## 2018-02-27 NOTE — PSYCH
Psychotherapy Provided: Individual Psychotherapy 45 minutes     Length of time in session: 45 minutes, follow up in 2 week    Goals addressed in session: Goal 1     Pain:      none    0    Current suicide risk : Low     D- Linda stated that she has been seeing someone recently but has some concerns  She shared that he has bipolar disorder and has had some major difficulties in his life due to this  Discussing the issues and her specific concerns  Continuing to work on ways to maintain healthy relationships in her life  Giving supportive therapy  A- Progress - Continuing to work on building and maintaining healthy relationships in her life  P-Continue treatment    Behavioral Health Treatment Plan  [facility]: Diagnosis and Treatment Plan explained to Rashida Arguello relates understanding diagnosis and is agreeable to Treatment Plan   Yes

## 2018-03-01 DIAGNOSIS — Z12.31 ENCOUNTER FOR SCREENING MAMMOGRAM FOR MALIGNANT NEOPLASM OF BREAST: ICD-10-CM

## 2018-03-19 ENCOUNTER — OFFICE VISIT (OUTPATIENT)
Dept: BEHAVIORAL/MENTAL HEALTH CLINIC | Facility: CLINIC | Age: 48
End: 2018-03-19
Payer: COMMERCIAL

## 2018-03-19 DIAGNOSIS — F33.1 MAJOR DEPRESSIVE DISORDER, RECURRENT EPISODE, MODERATE (HCC): Primary | ICD-10-CM

## 2018-03-19 PROCEDURE — 90834 PSYTX W PT 45 MINUTES: CPT | Performed by: SOCIAL WORKER

## 2018-03-19 NOTE — PSYCH
Psychotherapy Provided: Individual Psychotherapy 45 minutes     Length of time in session: 45 minutes, follow up in 3 week    Goals addressed in session: Goal 1     Pain:      none    0    Current suicide risk : Low     D- Linda stated that she has been doing well  She stated that she continues to explore her new relationship and has been not sharing details with her family about it  Processing her emotions and discussing ways to maintain healthy relationships in her life  Also discussing the dynamics of her new relationship and how to overcome hurdles  Giving supportive therapy  A- Progress - Continuing to work on maintaining healthy relationships in her life  P- Continue treatment    Behavioral Health Treatment Plan [facility]: Diagnosis and Treatment Plan explained to Yesenia Ahuja relates understanding diagnosis and is agreeable to Treatment Plan   Yes

## 2018-03-29 DIAGNOSIS — B00.9 HERPES: Primary | ICD-10-CM

## 2018-03-30 RX ORDER — VALACYCLOVIR HYDROCHLORIDE 500 MG/1
500 TABLET, FILM COATED ORAL DAILY
Qty: 30 TABLET | Refills: 8 | Status: SHIPPED | OUTPATIENT
Start: 2018-03-30 | End: 2018-11-13 | Stop reason: SDUPTHER

## 2018-04-10 ENCOUNTER — OFFICE VISIT (OUTPATIENT)
Dept: BEHAVIORAL/MENTAL HEALTH CLINIC | Facility: CLINIC | Age: 48
End: 2018-04-10
Payer: COMMERCIAL

## 2018-04-10 DIAGNOSIS — F33.1 MAJOR DEPRESSIVE DISORDER, RECURRENT EPISODE, MODERATE (HCC): Primary | ICD-10-CM

## 2018-04-10 PROCEDURE — 90834 PSYTX W PT 45 MINUTES: CPT | Performed by: SOCIAL WORKER

## 2018-04-10 NOTE — PSYCH
Psychotherapy Provided: Individual Psychotherapy 45 minutes     Length of time in session: 45 minutes, follow up in 2 week    Goals addressed in session: Goal 1     Pain:      none    0    Current suicide risk : Low     D- Linda stated that she feels that she is doing well  She shared that she feels that she may have Bipolar Disorder and ADHD  Discussing the symptoms that she is experiencing that would lead her to this conclusion  Discussing a psychiatric referral to discuss the symptoms  Continuing to work on building and maintaining healthy relationships in her life  Discussing her relationship with her boyfriend and ways to continue to communicate and problem solve effectively as well as navigate the challenges presented  Giving supportive therapy  A- Progress - Continuing to process her emotions  P-Continue treatment    [address]: Diagnosis and Treatment Plan explained to Isi Morales relates understanding diagnosis and is agreeable to Treatment Plan   Yes

## 2018-04-16 ENCOUNTER — TRANSCRIBE ORDERS (OUTPATIENT)
Dept: RADIOLOGY | Facility: CLINIC | Age: 48
End: 2018-04-16

## 2018-04-17 ENCOUNTER — HOSPITAL ENCOUNTER (OUTPATIENT)
Dept: RADIOLOGY | Age: 48
Discharge: HOME | End: 2018-04-17
Payer: COMMERCIAL

## 2018-04-17 DIAGNOSIS — Z12.31 ENCOUNTER FOR SCREENING MAMMOGRAM FOR MALIGNANT NEOPLASM OF BREAST: ICD-10-CM

## 2018-04-17 PROCEDURE — 77067 SCR MAMMO BI INCL CAD: CPT

## 2018-06-11 ENCOUNTER — OFFICE VISIT (OUTPATIENT)
Dept: BEHAVIORAL/MENTAL HEALTH CLINIC | Facility: CLINIC | Age: 48
End: 2018-06-11
Payer: COMMERCIAL

## 2018-06-11 DIAGNOSIS — F33.1 MAJOR DEPRESSIVE DISORDER, RECURRENT EPISODE, MODERATE (HCC): Primary | ICD-10-CM

## 2018-06-11 PROCEDURE — 90834 PSYTX W PT 45 MINUTES: CPT | Performed by: SOCIAL WORKER

## 2018-06-11 NOTE — PSYCH
Psychotherapy Provided: Individual Psychotherapy 45 minutes     Length of time in session: 45 minutes, follow up in 2 week    Goals addressed in session: Goal 1     Pain:      none    0    Current suicide risk : Low     D- Linda stated that her boyfriend recently broke up with her  She stated that he is having a difficult time with his divorce  Processing her emotions and discussing ways for her to cope with this  Continuing to work on forming and maintaining healthy relationships in her life  Giving supportive therapy  A- Progress - Continuing to process her emotions  P-Continue treatment    [address]: Diagnosis and Treatment Plan explained to Janeth Nix relates understanding diagnosis and is agreeable to Treatment Plan   Yes

## 2018-06-25 ENCOUNTER — OFFICE VISIT (OUTPATIENT)
Dept: BEHAVIORAL/MENTAL HEALTH CLINIC | Facility: CLINIC | Age: 48
End: 2018-06-25
Payer: COMMERCIAL

## 2018-06-25 DIAGNOSIS — F33.1 MAJOR DEPRESSIVE DISORDER, RECURRENT EPISODE, MODERATE (HCC): Primary | ICD-10-CM

## 2018-06-25 PROCEDURE — 90834 PSYTX W PT 45 MINUTES: CPT | Performed by: SOCIAL WORKER

## 2018-06-25 NOTE — PSYCH
Psychotherapy Provided: Individual Psychotherapy 45 minutes     Length of time in session: 45 minutes, follow up in 2 week    Goals addressed in session: Goal 1     Pain:      none    0    Current suicide risk : Low     D- Linda stated that she had received a text message from her ex-boyfriend  She stated that it was somewhat confusing because he then asked to speak to her and did not follow through  Processing her emotions and discussing his issues with his ex-wife as well as the complications related to bipolar disorder  Discussing setting and maintaining healthy boundaries as well as maintaining effective communication  Reviewing and renewing her treatment plan  Giving supportive therapy  A- Progress - Continuing to process her emotions  P- Continue treatment    Behavioral Health Treatment Plan  [facility]: Diagnosis and Treatment Plan explained to Cecilia Poster relates understanding diagnosis and is agreeable to Treatment Plan   Yes

## 2018-06-25 NOTE — PSYCH
Linda Dlegado  1970       Date of Initial Treatment Plan: 2/2015   Date of Current Treatment Plan: 06/25/18    Strengths/Personal Resources for Self Care: Embracing my valdez, strong, good decision making, healthy relationships, looking at the big picture         Diagnosis:   No diagnosis found  Area of Needs: RElationships      Long Term Goal 1: Form and maintain heathy relationships in my life     Target Date: 9/25/18  Completion Date: TBD     Short Term Objectives for Goal 1:   Maintain healthy boundaries   Communicate what I'm feeling   Continue to be true to myself      GOAL 1: Modality: Individual 1-2x per month   Completion Date TBD and The person(s) responsible for carrying out the plan is  Linda        Behavioral Health Treatment Plan [facility]: Diagnosis and Treatment Plan explained to Jenise Chaidez relates understanding diagnosis and is agreeable to Treatment Plan         Client Comments : Please share your thoughts, feelings, need and/or experiences regarding your treatment plan:       __________________________________________________________________    __________________________________________________________________    __________________________________________________________________    __________________________________________________________________    _______________________________________                Patient signature, Date Time: __________________________________________             Physician cosigner signature, Date, Time: ________________________________

## 2018-07-09 ENCOUNTER — OFFICE VISIT (OUTPATIENT)
Dept: BEHAVIORAL/MENTAL HEALTH CLINIC | Facility: CLINIC | Age: 48
End: 2018-07-09
Payer: COMMERCIAL

## 2018-07-09 DIAGNOSIS — F33.1 MAJOR DEPRESSIVE DISORDER, RECURRENT EPISODE, MODERATE (HCC): Primary | ICD-10-CM

## 2018-07-09 PROCEDURE — 90834 PSYTX W PT 45 MINUTES: CPT | Performed by: SOCIAL WORKER

## 2018-07-09 NOTE — PSYCH
Psychotherapy Provided: Individual Psychotherapy 45 minutes     Length of time in session: 45 minutes, follow up in 3 week    Goals addressed in session: Goal 1     Pain:      none    0    Current suicide risk : Low     D- Linda stated that she has gotten back together with her boyfriend  She stated that they have been communicating and she feels that it has been going well  Discussing the relationship and ways to continue to maintain ongoing communication  Also discussing the differences in this relationship as compared to her past relationships  Giving supportive therapy  A- Progress - Continuing to process her emotions  P-Continue treatment    [address]: Diagnosis and Treatment Plan explained to Madhav Arellano relates understanding diagnosis and is agreeable to Treatment Plan   Yes

## 2018-08-30 ENCOUNTER — OFFICE VISIT (OUTPATIENT)
Dept: BEHAVIORAL/MENTAL HEALTH CLINIC | Facility: CLINIC | Age: 48
End: 2018-08-30
Payer: COMMERCIAL

## 2018-08-30 DIAGNOSIS — F33.1 MAJOR DEPRESSIVE DISORDER, RECURRENT EPISODE, MODERATE (HCC): Primary | ICD-10-CM

## 2018-08-30 PROCEDURE — 90834 PSYTX W PT 45 MINUTES: CPT | Performed by: SOCIAL WORKER

## 2018-08-30 NOTE — PSYCH
Psychotherapy Provided: Individual Psychotherapy 45 minutes     Length of time in session: 45 minutes, follow up in 2 week    Goals addressed in session: Goal 1     Pain:      none    0    Current suicide risk : Low     D- Linda stated that she continues to see her boyfriend  She shared that she has met his children several times now and that it is going well  She stated that he worries about his diagnosis and how it will affect their relationship  Discussing ways to maintain a healthy relationship  Reviewing and signing her treatment plan  Giving supportive therapy  A- Progress - Continuing to process her emotions  P-Continue treatment    [address]: Diagnosis and Treatment Plan explained to Jeny Badillo relates understanding diagnosis and is agreeable to Treatment Plan   Yes

## 2018-09-18 ENCOUNTER — OFFICE VISIT (OUTPATIENT)
Dept: BEHAVIORAL/MENTAL HEALTH CLINIC | Facility: CLINIC | Age: 48
End: 2018-09-18
Payer: COMMERCIAL

## 2018-09-18 DIAGNOSIS — F33.1 MAJOR DEPRESSIVE DISORDER, RECURRENT EPISODE, MODERATE (HCC): Primary | ICD-10-CM

## 2018-09-18 PROCEDURE — 90834 PSYTX W PT 45 MINUTES: CPT | Performed by: SOCIAL WORKER
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
## 2018-10-09 ENCOUNTER — OFFICE VISIT (OUTPATIENT)
Dept: BEHAVIORAL/MENTAL HEALTH CLINIC | Facility: CLINIC | Age: 48
End: 2018-10-09
Payer: COMMERCIAL

## 2018-10-09 DIAGNOSIS — F33.1 MAJOR DEPRESSIVE DISORDER, RECURRENT EPISODE, MODERATE (HCC): Primary | ICD-10-CM

## 2018-10-09 PROCEDURE — 90834 PSYTX W PT 45 MINUTES: CPT | Performed by: SOCIAL WORKER

## 2018-10-09 NOTE — PSYCH
Megha Cesar  1970       Date of Initial Treatment Plan: 2/20158   Date of Current Treatment Plan: 10/09/18    Strengths/Personal Resources for Self Care: Embracing my valdez, strong, good decision making, healthy relationships, looking at the big picture         Diagnosis:   No diagnosis found  Area of Needs:   Relationships    Long Term Goal 1: Form and maintain heathy relationships in my life     Target Date: 1/9/19  Completion Date: TBD         Short Term Objectives for Goal 1:    Maintain healthy boundaries   Communicate what I'm feeling   Continue to be true to myself      GOAL 1: Modality: Individual 1-2x per month   Completion Date TBD and The person(s) responsible for carrying out the plan is  [address]: Diagnosis and Treatment Plan explained to Isaias Begun relates understanding diagnosis and is agreeable to Treatment Plan         Client Comments : Please share your thoughts, feelings, need and/or experiences regarding your treatment plan:       __________________________________________________________________    __________________________________________________________________    __________________________________________________________________    __________________________________________________________________    _______________________________________                Patient signature, Date Time: __________________________________________             Physician cosigner signature, Date, Time: ________________________________

## 2018-10-09 NOTE — PSYCH
Treatment Plan Tracking    Treatment Plan not completed within required time limits due to: Treatment plan renewed verbally  PT will sign at the next session

## 2018-10-09 NOTE — PSYCH
Psychotherapy Provided: Individual Psychotherapy 45 minutes     Length of time in session: 45 minutes, follow up in 3 week    Goals addressed in session: Goal 1     Pain:      none    0    Current suicide risk : Low     D- Linda stated that she has been very upset with her family  She stated that they have been nasty and judgement towards her  She stated that they had been planning her parents anniversary party and that they questioned her decision to bring her boyfriend  Processing her emotions and discussing ways for her to continue to set boundaries with her family members  Also discussing the inappropriate nature of their continued contact with her ex-boyfriend  Continuing to work on ways to maintain healthy relationships in her life  Reviewing and renewing her treatment plan  Giving supportive therapy  A- Progress - Continuing to process her emotions  P -Continue treatment    Behavioral Health Treatment Plan [facility]: Diagnosis and Treatment Plan explained to Yesenia Ahuja relates understanding diagnosis and is agreeable to Treatment Plan   Yes

## 2018-11-05 ENCOUNTER — OFFICE VISIT (OUTPATIENT)
Dept: BEHAVIORAL/MENTAL HEALTH CLINIC | Facility: CLINIC | Age: 48
End: 2018-11-05
Payer: COMMERCIAL

## 2018-11-05 DIAGNOSIS — F33.1 MAJOR DEPRESSIVE DISORDER, RECURRENT EPISODE, MODERATE (HCC): Primary | ICD-10-CM

## 2018-11-05 PROCEDURE — 90834 PSYTX W PT 45 MINUTES: CPT | Performed by: SOCIAL WORKER

## 2018-11-07 ENCOUNTER — TELEPHONE (OUTPATIENT)
Dept: OBGYN CLINIC | Facility: CLINIC | Age: 48
End: 2018-11-07

## 2018-11-13 ENCOUNTER — APPOINTMENT (OUTPATIENT)
Dept: LAB | Facility: HOSPITAL | Age: 48
End: 2018-11-13
Payer: COMMERCIAL

## 2018-11-13 ENCOUNTER — ANNUAL EXAM (OUTPATIENT)
Dept: OBGYN CLINIC | Facility: CLINIC | Age: 48
End: 2018-11-13
Payer: COMMERCIAL

## 2018-11-13 VITALS — SYSTOLIC BLOOD PRESSURE: 110 MMHG | BODY MASS INDEX: 24.92 KG/M2 | WEIGHT: 145.2 LBS | DIASTOLIC BLOOD PRESSURE: 70 MMHG

## 2018-11-13 DIAGNOSIS — Z01.419 ENCOUNTER FOR GYNECOLOGICAL EXAMINATION: Primary | ICD-10-CM

## 2018-11-13 DIAGNOSIS — Z12.31 VISIT FOR SCREENING MAMMOGRAM: ICD-10-CM

## 2018-11-13 DIAGNOSIS — Z11.3 SCREEN FOR STD (SEXUALLY TRANSMITTED DISEASE): ICD-10-CM

## 2018-11-13 DIAGNOSIS — B00.9 HERPES: ICD-10-CM

## 2018-11-13 PROCEDURE — 99396 PREV VISIT EST AGE 40-64: CPT | Performed by: PHYSICIAN ASSISTANT

## 2018-11-13 PROCEDURE — 87389 HIV-1 AG W/HIV-1&-2 AB AG IA: CPT | Performed by: PHYSICIAN ASSISTANT

## 2018-11-13 PROCEDURE — 36415 COLL VENOUS BLD VENIPUNCTURE: CPT | Performed by: PHYSICIAN ASSISTANT

## 2018-11-13 PROCEDURE — 87624 HPV HI-RISK TYP POOLED RSLT: CPT | Performed by: PHYSICIAN ASSISTANT

## 2018-11-13 PROCEDURE — G0145 SCR C/V CYTO,THINLAYER,RESCR: HCPCS | Performed by: PHYSICIAN ASSISTANT

## 2018-11-13 RX ORDER — METHYLPREDNISOLONE 4 MG/1
TABLET ORAL
COMMUNITY
End: 2019-05-20

## 2018-11-13 RX ORDER — ASCORBIC ACID 100 MG
TABLET,CHEWABLE ORAL
COMMUNITY

## 2018-11-13 RX ORDER — MAG HYDROX/ALUMINUM HYD/SIMETH 400-400-40
SUSPENSION, ORAL (FINAL DOSE FORM) ORAL
COMMUNITY

## 2018-11-13 RX ORDER — PSEUDOEPHEDRINE HYDROCHLORIDE 30 MG/1
TABLET ORAL DAILY
COMMUNITY
End: 2019-05-20

## 2018-11-13 RX ORDER — VALACYCLOVIR HYDROCHLORIDE 500 MG/1
TABLET, FILM COATED ORAL
COMMUNITY
Start: 2018-10-31 | End: 2019-05-20 | Stop reason: SDUPTHER

## 2018-11-13 RX ORDER — MENTHOL
GEL (GRAM) TOPICAL
COMMUNITY
End: 2019-05-20 | Stop reason: SDUPTHER

## 2018-11-13 RX ORDER — CYCLOBENZAPRINE HCL 10 MG
TABLET ORAL
COMMUNITY
End: 2019-05-20

## 2018-11-13 RX ORDER — LANOLIN ALCOHOL/MO/W.PET/CERES
CREAM (GRAM) TOPICAL
COMMUNITY

## 2018-11-13 RX ORDER — VALACYCLOVIR HYDROCHLORIDE 500 MG/1
1 TABLET, FILM COATED ORAL DAILY
COMMUNITY
Start: 2014-10-31 | End: 2020-01-27 | Stop reason: SDUPTHER

## 2018-11-13 RX ORDER — VALACYCLOVIR HYDROCHLORIDE 500 MG/1
500 TABLET, FILM COATED ORAL DAILY
Qty: 30 TABLET | Refills: 11 | Status: SHIPPED | OUTPATIENT
Start: 2018-11-13 | End: 2019-05-20

## 2018-11-13 RX ORDER — CHOLECALCIFEROL (VITAMIN D3) 1250 MCG
CAPSULE ORAL
COMMUNITY
End: 2021-03-01

## 2018-11-13 RX ORDER — ESCITALOPRAM OXALATE 20 MG/1
TABLET ORAL
COMMUNITY
End: 2019-05-20

## 2018-11-13 RX ORDER — OXYCODONE HYDROCHLORIDE AND ACETAMINOPHEN 5; 325 MG/1; MG/1
TABLET ORAL
COMMUNITY
End: 2019-05-20

## 2018-11-13 RX ORDER — ZOLPIDEM TARTRATE 5 MG/1
TABLET ORAL
COMMUNITY
End: 2019-05-20

## 2018-11-13 NOTE — PROGRESS NOTES
Assessment/Plan:    No problem-specific Assessment & Plan notes found for this encounter  Diagnoses and all orders for this visit:    Encounter for gynecological examination  -     Cancel: Liquid-based pap, screening  -     Liquid-based pap, screening    Visit for screening mammogram  -     Mammo screening bilateral w cad; Future    Screen for STD (sexually transmitted disease)  -     HIV 1/2 AG-AB combo    Herpes  -     valACYclovir (VALTREX) 500 mg tablet; Take 1 tablet (500 mg total) by mouth daily for 30 days    Other orders  -     cyclobenzaprine (FLEXERIL) 10 mg tablet; cyclobenzaprine 10 mg tablet  -     CoenzymeQ10-Isoleucine-Glycine (CO Q-10) 100-50-25 MG TB24; Co Q-10  -     Calcium 500-100 MG-UNIT CHEW; Calcium 500  -     valACYclovir (VALTREX) 500 mg tablet; Take 1 tablet by mouth daily  -     Cetirizine HCl (ZYRTEC ALLERGY) 10 MG CAPS; Take by mouth  -     zolpidem (AMBIEN) 5 mg tablet; zolpidem 5 mg tablet  -     Cholecalciferol (VITAMIN D3) 2000 units capsule; Take 2 capsules by oral route  -     Cholecalciferol (VITAMIN D3 PO); Vitamin D3  -     Cholecalciferol (SM VITAMIN D) 400 units TABS; Take by mouth  -     Ascorbic Acid (VITAMIN C) 100 MG CHEW; Vitamin C  -     valACYclovir (VALTREX) 500 mg tablet;   -     escitalopram (LEXAPRO) 20 mg tablet; escitalopram 20 mg tablet  -     Magnesium 100 MG CAPS; magnesium  -     methylprednisolone (MEDROL) 4 mg tablet; methylprednisolone 4 mg tablets in a dose pack  -     pseudoephedrine (SUDAFED) 30 mg tablet; Daily  -     oxyCODONE-acetaminophen (PERCOCET) 5-325 mg per tablet; Take 1-2  tablets by oral route every 4-6 hours as needed        Counseled patient on HPV  Explained that while removal of cervix should remove risk of HPV, it can also live in the upper walls of the vagina  Discussed cyclic nature of virus    She could have been exposed to a different strain of virus through her new partner; less likely that her immune system would clear new exposure because of patient's age  Pap with HPV cotesting done at patient's request   Explained that if she tests negative, she could still have dormant HPV  Will continue to monitor  Order for HIV screening entered; we will call with results  Refills of daily Valtex sent to pharmacy  Subjective:      Patient ID: Dona Owen is a 53 y o  female  Patient is here for yearly gyn exam   States she is doing well overall  She is s/p JAMIE 6 years ago, but still has both ovaries; has a history of positive HPV and LEEP procedure  States she has had intercourse with a new, monogamous partner  A past partner of his tested positive for HPV  All other STD testing for her partner, including HIV, was negative  Patient is requesting HPV testing, as well as HIV screening  Declines any other STD testing  They both have a history of genital HSV  Patient is on daily suppressive therapy; requests refills today  Only had 1 outbreak in the last year  She denies any perimenopausal symptoms  Patient denies any change in bowel/bladder habits, pelvic pain, bleeding, bloating, abdominal pain, n/v, change in appetite, and thyroid disease  Patient due for mammogram in April  She is not performing self-breast exam   Denies new masses, skin changes, nipple discharge, and pain/tenderness  The following portions of the patient's history were reviewed and updated as appropriate: allergies, current medications, past family history, past medical history, past social history, past surgical history and problem list     Review of Systems   Constitutional: Negative for appetite change and unexpected weight change  Cardiovascular:        No masses, skin changes, nipple discharge, and pain/tenderness  Gastrointestinal: Negative for abdominal distention, abdominal pain, constipation, diarrhea, nausea and vomiting     Genitourinary: Negative for difficulty urinating, dysuria, frequency, genital sores, hematuria, menstrual problem, pelvic pain, urgency, vaginal bleeding, vaginal discharge and vaginal pain  Objective:      /70   Wt 65 9 kg (145 lb 3 2 oz)   BMI 24 92 kg/m²          Physical Exam   Constitutional: She is oriented to person, place, and time  Vital signs are normal  She appears well-developed and well-nourished  Neck: No thyromegaly present  Cardiovascular: Normal rate, regular rhythm and normal heart sounds  Exam reveals no gallop and no friction rub  No murmur heard  Pulmonary/Chest: Effort normal and breath sounds normal  Right breast exhibits no inverted nipple, no mass, no nipple discharge, no skin change and no tenderness  Left breast exhibits no inverted nipple, no mass, no nipple discharge, no skin change and no tenderness  Breasts are symmetrical    Abdominal: Soft  Normal appearance and bowel sounds are normal  She exhibits no distension  There is no tenderness  Genitourinary: Vagina normal  No breast swelling, tenderness, discharge or bleeding  No labial fusion  There is no rash, tenderness, lesion or injury on the right labia  There is no rash, tenderness, lesion or injury on the left labia  Right adnexum displays no mass, no tenderness and no fullness  Left adnexum displays no mass, no tenderness and no fullness  No erythema, tenderness or bleeding in the vagina  No vaginal discharge found  Genitourinary Comments: Cervix and uterus surgically absent   Lymphadenopathy:     She has no cervical adenopathy  Right: No inguinal adenopathy present  Left: No inguinal adenopathy present  Neurological: She is alert and oriented to person, place, and time  Skin: Skin is warm and dry  Psychiatric: She has a normal mood and affect  Her behavior is normal  Judgment and thought content normal    Vitals reviewed

## 2018-11-14 LAB — HIV 1+2 AB+HIV1 P24 AG SERPL QL IA: NORMAL

## 2018-11-16 LAB
HPV HR 12 DNA CVX QL NAA+PROBE: POSITIVE
HPV16 DNA CVX QL NAA+PROBE: NEGATIVE
HPV18 DNA CVX QL NAA+PROBE: NEGATIVE

## 2018-11-19 ENCOUNTER — OFFICE VISIT (OUTPATIENT)
Dept: BEHAVIORAL/MENTAL HEALTH CLINIC | Facility: CLINIC | Age: 48
End: 2018-11-19
Payer: COMMERCIAL

## 2018-11-19 DIAGNOSIS — F33.1 MAJOR DEPRESSIVE DISORDER, RECURRENT EPISODE, MODERATE (HCC): Primary | ICD-10-CM

## 2018-11-19 PROCEDURE — 90834 PSYTX W PT 45 MINUTES: CPT | Performed by: SOCIAL WORKER

## 2018-11-19 NOTE — PSYCH
Psychotherapy Provided: Individual Psychotherapy 45 minutes     Length of time in session: 45 minutes, follow up in 1 month    Goals addressed in session: Goal 1     Pain:      none    0    Current suicide risk : Low     D- Linda stated that she has been struggling somewhat in her relationship  She stated that her boyfriend has been somewhat depressed and has questioned why she would want to be with him  She also discussed her relationships with her family members and their negativity towards her  Processing her emotions and discussing ways for her to continue to build a healthy relationship with her boyfriend  Also continuing to discuss ways to maintain healthy boundaries with her family members  Giving supportive therapy  A- Progress - Continuing to process her emotions  P-Continue treatment    [address]: Diagnosis and Treatment Plan explained to Bony Ragland relates understanding diagnosis and is agreeable to Treatment Plan   Yes

## 2018-11-20 LAB
LAB AP GYN PRIMARY INTERPRETATION: NORMAL
Lab: NORMAL

## 2018-11-29 ENCOUNTER — TELEPHONE (OUTPATIENT)
Dept: OBGYN CLINIC | Facility: CLINIC | Age: 48
End: 2018-11-29

## 2018-11-29 NOTE — TELEPHONE ENCOUNTER
Spoke with patient regarding Pap results  Cytology for vaginal vault negative, but positive for other HR HPV  Per Dr Karrie Martin, patient to have at least visual colposcopy  Also discussed family history  MGM had ovarian cancer; no genetic testing that patient knows of  She will consider having testing done  Patient to call back to schedule colposcopy

## 2018-11-30 ENCOUNTER — TELEPHONE (OUTPATIENT)
Dept: OBGYN CLINIC | Facility: CLINIC | Age: 48
End: 2018-11-30

## 2018-11-30 NOTE — TELEPHONE ENCOUNTER
Answered patient's questions regarding genetic testing  Explained that she meets criteria because of MGM ovarian cancer  Discussed [facility] and financial hardship options if insurance doesn't cover  Has appointment on 12/3 for colposcopy for positive HR HPV  Will schedule genetic testing appointment today as well

## 2018-12-03 ENCOUNTER — PROCEDURE VISIT (OUTPATIENT)
Dept: OBGYN CLINIC | Facility: CLINIC | Age: 48
End: 2018-12-03
Payer: COMMERCIAL

## 2018-12-03 VITALS — DIASTOLIC BLOOD PRESSURE: 62 MMHG | SYSTOLIC BLOOD PRESSURE: 100 MMHG | BODY MASS INDEX: 25.03 KG/M2 | WEIGHT: 145.8 LBS

## 2018-12-03 DIAGNOSIS — B97.7 HIGH RISK HPV INFECTION: Primary | ICD-10-CM

## 2018-12-03 PROCEDURE — 88305 TISSUE EXAM BY PATHOLOGIST: CPT | Performed by: PATHOLOGY

## 2018-12-03 PROCEDURE — 57421 EXAM/BIOPSY OF VAG W/SCOPE: CPT | Performed by: OBSTETRICS & GYNECOLOGY

## 2018-12-03 NOTE — PATIENT INSTRUCTIONS
Topic:  High-risk HPV/other Pap smear remainder of the Pap smear was negative    Colposcopy performed today    Small aceto-white area biopsy at 11 o'clock on the vaginal cuff    Complete hemostasis confirmed    Further treatment will be based upon final results    Patient will be seen for her annual examination as planned    Patient call should any problems issues or concerns arise

## 2018-12-03 NOTE — PROGRESS NOTES
Colposcopy  Date/Time: 12/3/2018 11:47 AM  Performed by: Theresa Weiss  Authorized by: Theresa Weiss     Consent:     Consent obtained:  Verbal    Consent given by:  Patient    Procedural risks discussed:  Bleeding    Patient questions answered: yes      Patient agrees, verbalizes understanding, and wants to proceed: yes      Educational handouts given: no      Instructions and paperwork completed: yes    Universal protocol:     Patient states understanding of procedure being performed: yes      Relevant documents present and verified: yes      Test results available and properly labeled: yes      Imaging studies available: no      Required blood products, implants, devices, and special equipment available: yes      Site marked: no    Pre-procedure:     Pre-procedure timeout performed: yes      Prepped with: acetic acid    Indication:     Indications: High-grade/other HPV    Procedure:     Procedure: Colposcopy of vagina w/ biopsy      Under satisfactory analgesia the patient was prepped and draped in the dorsal lithotomy position: yes      [place] speculum was placed in the vagina: yes      Intracervical block was performed: no      Tampon inserted: no      Monsel's solution was applied: yes      Biopsy(s): yes      Location:  Eleven o'clock on the vaginal cuff    Specimen to pathology: yes    Post-procedure:     Findings: Mosaicism      Impression comment:  Possible HPV change  Comments:      Topic:  high risk/other HPV on Pap smear       remainder of Pap smear was negative    Colposcopy of the vagina and vaginal cuff performed without difficulty    Very small area of mild was a change seen at 11 o'clock on the vaginal cuff in small vaginal cuff biopsy taken    Minimal bleeding seen and small amount of Monsel's applied    Complete hemostasis confirmed firm    Instructions and restrictions given to patient    Further treatment and follow-up will be based upon final pathology    Patient will be seen for annual examination as planned    All questions were answered in detail for her at today's visit    Patient call should any problems issues or concerns arise during the interim

## 2018-12-04 ENCOUNTER — OFFICE VISIT (OUTPATIENT)
Dept: OBGYN CLINIC | Facility: CLINIC | Age: 48
End: 2018-12-04
Payer: COMMERCIAL

## 2018-12-04 VITALS — BODY MASS INDEX: 24.89 KG/M2 | DIASTOLIC BLOOD PRESSURE: 64 MMHG | SYSTOLIC BLOOD PRESSURE: 100 MMHG | WEIGHT: 145 LBS

## 2018-12-04 DIAGNOSIS — Z13.79 GENETIC TESTING: Primary | ICD-10-CM

## 2018-12-04 DIAGNOSIS — Z80.41 FAMILY HISTORY OF OVARIAN CANCER: ICD-10-CM

## 2018-12-04 PROCEDURE — 99214 OFFICE O/P EST MOD 30 MIN: CPT | Performed by: PHYSICIAN ASSISTANT

## 2018-12-04 NOTE — PROGRESS NOTES
Assessment/Plan:    No problem-specific Assessment & Plan notes found for this encounter  Diagnoses and all orders for this visit:    Genetic testing    Family history of ovarian cancer        Counseled patient on general population vs  family history vs  known genetic mutation cancer risk  Discussed BRCA, Eubanks syndrome, and other mutations tested for in [facility] panel  Counseled patient on autosomal dominant nature of these mutations  Explained possible results - positive, negative, and negative with variant of unknown significance  Educated patient on possible management changes and risk reduction strategies if a positive is found  Discussed that there are no standardized screening protocols for ovarian CA at this time  Usually only order pelvic U/S if symptoms are present  Explained to patient that a negative does not mean that she will never get cancer, only that she is not at increased cancer risk from these specific mutations  Discussed possible need for family member follow-up as well  Explained [facility] breast cancer RiskScore  Answered all patient's questions  Received [facility] patient information booklet  Consent given, and sample obtained  Will be sent to [facility] lab for testing  F/u for results in 4 weeks  Time of visit 30 minutes; >50% spent counseling  Subjective:      Patient ID: Robina Dallas is a 53 y o  female  Patient is here for genetic testing secondary to family history of ovarian cancer  No changes since her last visit  She is of white/non- ancestry  Family is from [place] and [facility]; no known Zoroastrianism ancestry  Patient has no personal history of cancer  Her MGM had ovarian/peritoneal cancer under the age of 49  Patient had a hysterectomy/RSO at age 40  She has never used HRT  She has never had a breast biopsy          The following portions of the patient's history were reviewed and updated as appropriate: allergies, current medications, past family history, past medical history, past social history, past surgical history and problem list     Review of Systems   Genitourinary: Negative  Objective:      /64   Wt 65 8 kg (145 lb)   BMI 24 89 kg/m²          Physical Exam   Constitutional: She is oriented to person, place, and time  Vital signs are normal  She appears well-developed and well-nourished  Neurological: She is alert and oriented to person, place, and time  Psychiatric: She has a normal mood and affect  Her behavior is normal  Judgment and thought content normal    Vitals reviewed

## 2018-12-17 ENCOUNTER — OFFICE VISIT (OUTPATIENT)
Dept: BEHAVIORAL/MENTAL HEALTH CLINIC | Facility: CLINIC | Age: 48
End: 2018-12-17
Payer: COMMERCIAL

## 2018-12-17 DIAGNOSIS — F33.1 MAJOR DEPRESSIVE DISORDER, RECURRENT EPISODE, MODERATE (HCC): Primary | ICD-10-CM

## 2018-12-17 PROCEDURE — 90834 PSYTX W PT 45 MINUTES: CPT | Performed by: SOCIAL WORKER

## 2018-12-17 NOTE — PSYCH
Psychotherapy Provided: Individual Psychotherapy 45 minutes     Length of time in session: 45 minutes, follow up in 2 week    Goals addressed in session: Goal 1     Pain:      none    0    Current suicide risk : Low     D- Linda stated that she has been struggling with the holidays  She stated that it continues to be difficult with her family  She also shared that her boyfriend has also been struggling  Processing her emotions and discussing the best ways for her to be able to navigate the holiday season  Also discussing ways for her to maintain boundaries with her family members  Giving supportive therapy  A- Progress - Continuing to process her emotions  P-Continue treatment     [address]: Diagnosis and Treatment Plan explained to Nelda Ghosh relates understanding diagnosis and is agreeable to Treatment Plan   Yes

## 2018-12-27 ENCOUNTER — TRANSCRIBE ORDERS (OUTPATIENT)
Dept: LAB | Facility: HOSPITAL | Age: 48
End: 2018-12-27

## 2018-12-27 ENCOUNTER — APPOINTMENT (OUTPATIENT)
Dept: LAB | Facility: HOSPITAL | Age: 48
End: 2018-12-27
Payer: COMMERCIAL

## 2018-12-27 ENCOUNTER — OFFICE VISIT (OUTPATIENT)
Dept: OBGYN CLINIC | Facility: CLINIC | Age: 48
End: 2018-12-27
Payer: COMMERCIAL

## 2018-12-27 VITALS — WEIGHT: 142.6 LBS | SYSTOLIC BLOOD PRESSURE: 100 MMHG | BODY MASS INDEX: 24.48 KG/M2 | DIASTOLIC BLOOD PRESSURE: 64 MMHG

## 2018-12-27 DIAGNOSIS — Z00.00 ROUTINE GENERAL MEDICAL EXAMINATION AT A HEALTH CARE FACILITY: ICD-10-CM

## 2018-12-27 DIAGNOSIS — R45.1 AGITATED: ICD-10-CM

## 2018-12-27 DIAGNOSIS — E55.9 AVITAMINOSIS D: ICD-10-CM

## 2018-12-27 DIAGNOSIS — Z71.2 ENCOUNTER TO DISCUSS TEST RESULTS: ICD-10-CM

## 2018-12-27 DIAGNOSIS — R53.83 OTHER FATIGUE: ICD-10-CM

## 2018-12-27 DIAGNOSIS — Z00.00 ROUTINE GENERAL MEDICAL EXAMINATION AT A HEALTH CARE FACILITY: Primary | ICD-10-CM

## 2018-12-27 DIAGNOSIS — Z13.79 GENETIC TESTING: Primary | ICD-10-CM

## 2018-12-27 DIAGNOSIS — Z12.31 VISIT FOR SCREENING MAMMOGRAM: ICD-10-CM

## 2018-12-27 LAB
25(OH)D3 SERPL-MCNC: 28.7 NG/ML (ref 30–100)
ALBUMIN SERPL BCP-MCNC: 3.8 G/DL (ref 3.5–5)
ALP SERPL-CCNC: 41 U/L (ref 46–116)
ALT SERPL W P-5'-P-CCNC: 18 U/L (ref 12–78)
ANION GAP SERPL CALCULATED.3IONS-SCNC: 5 MMOL/L (ref 4–13)
AST SERPL W P-5'-P-CCNC: 9 U/L (ref 5–45)
BASOPHILS # BLD AUTO: 0.04 THOUSANDS/ΜL (ref 0–0.1)
BASOPHILS NFR BLD AUTO: 1 % (ref 0–1)
BILIRUB SERPL-MCNC: 0.59 MG/DL (ref 0.2–1)
BUN SERPL-MCNC: 17 MG/DL (ref 5–25)
CALCIUM SERPL-MCNC: 8.5 MG/DL (ref 8.3–10.1)
CHLORIDE SERPL-SCNC: 104 MMOL/L (ref 100–108)
CHOLEST SERPL-MCNC: 214 MG/DL (ref 50–200)
CO2 SERPL-SCNC: 27 MMOL/L (ref 21–32)
CREAT SERPL-MCNC: 0.85 MG/DL (ref 0.6–1.3)
EOSINOPHIL # BLD AUTO: 0.14 THOUSAND/ΜL (ref 0–0.61)
EOSINOPHIL NFR BLD AUTO: 2 % (ref 0–6)
ERYTHROCYTE [DISTWIDTH] IN BLOOD BY AUTOMATED COUNT: 12 % (ref 11.6–15.1)
GFR SERPL CREATININE-BSD FRML MDRD: 82 ML/MIN/1.73SQ M
GLUCOSE P FAST SERPL-MCNC: 94 MG/DL (ref 65–99)
HCT VFR BLD AUTO: 43.4 % (ref 34.8–46.1)
HDLC SERPL-MCNC: 68 MG/DL (ref 40–60)
HGB BLD-MCNC: 14.3 G/DL (ref 11.5–15.4)
IMM GRANULOCYTES # BLD AUTO: 0.01 THOUSAND/UL (ref 0–0.2)
IMM GRANULOCYTES NFR BLD AUTO: 0 % (ref 0–2)
LDLC SERPL CALC-MCNC: 129 MG/DL (ref 0–100)
LYMPHOCYTES # BLD AUTO: 1.51 THOUSANDS/ΜL (ref 0.6–4.47)
LYMPHOCYTES NFR BLD AUTO: 25 % (ref 14–44)
MCH RBC QN AUTO: 30.9 PG (ref 26.8–34.3)
MCHC RBC AUTO-ENTMCNC: 32.9 G/DL (ref 31.4–37.4)
MCV RBC AUTO: 94 FL (ref 82–98)
MONOCYTES # BLD AUTO: 0.36 THOUSAND/ΜL (ref 0.17–1.22)
MONOCYTES NFR BLD AUTO: 6 % (ref 4–12)
NEUTROPHILS # BLD AUTO: 3.96 THOUSANDS/ΜL (ref 1.85–7.62)
NEUTS SEG NFR BLD AUTO: 66 % (ref 43–75)
NONHDLC SERPL-MCNC: 146 MG/DL
NRBC BLD AUTO-RTO: 0 /100 WBCS
PLATELET # BLD AUTO: 228 THOUSANDS/UL (ref 149–390)
PMV BLD AUTO: 9.6 FL (ref 8.9–12.7)
POTASSIUM SERPL-SCNC: 4.2 MMOL/L (ref 3.5–5.3)
PROT SERPL-MCNC: 7.5 G/DL (ref 6.4–8.2)
RBC # BLD AUTO: 4.63 MILLION/UL (ref 3.81–5.12)
SODIUM SERPL-SCNC: 136 MMOL/L (ref 136–145)
T4 FREE SERPL-MCNC: 0.75 NG/DL (ref 0.76–1.46)
TRIGL SERPL-MCNC: 85 MG/DL
TSH SERPL DL<=0.05 MIU/L-ACNC: 1.47 UIU/ML (ref 0.36–3.74)
WBC # BLD AUTO: 6.02 THOUSAND/UL (ref 4.31–10.16)

## 2018-12-27 PROCEDURE — 80061 LIPID PANEL: CPT

## 2018-12-27 PROCEDURE — 82306 VITAMIN D 25 HYDROXY: CPT

## 2018-12-27 PROCEDURE — 84439 ASSAY OF FREE THYROXINE: CPT

## 2018-12-27 PROCEDURE — 85025 COMPLETE CBC W/AUTO DIFF WBC: CPT

## 2018-12-27 PROCEDURE — 84443 ASSAY THYROID STIM HORMONE: CPT

## 2018-12-27 PROCEDURE — 80053 COMPREHEN METABOLIC PANEL: CPT

## 2018-12-27 PROCEDURE — 36415 COLL VENOUS BLD VENIPUNCTURE: CPT

## 2018-12-27 PROCEDURE — 99213 OFFICE O/P EST LOW 20 MIN: CPT | Performed by: PHYSICIAN ASSISTANT

## 2018-12-27 RX ORDER — ALPRAZOLAM 0.25 MG/1
TABLET ORAL
COMMUNITY
End: 2021-06-01

## 2018-12-27 RX ORDER — VALACYCLOVIR HYDROCHLORIDE 500 MG/1
TABLET, FILM COATED ORAL
COMMUNITY
End: 2021-03-10 | Stop reason: SDUPTHER

## 2018-12-27 RX ORDER — DULOXETIN HYDROCHLORIDE 30 MG/1
30 CAPSULE, DELAYED RELEASE ORAL DAILY
COMMUNITY

## 2018-12-27 NOTE — PROGRESS NOTES
Assessment/Plan:    No problem-specific Assessment & Plan notes found for this encounter  Diagnoses and all orders for this visit:    Genetic testing    Encounter to discuss test results    Visit for screening mammogram  -     Mammo screening bilateral w 3d & cad; Future    Other orders  -     valACYclovir (VALTREX) 500 mg tablet; TAKE 1 TABLET (500 MG TOTAL) BY MOUTH DAILY FOR 30 DAYS  -     DULoxetine (CYMBALTA) 30 mg delayed release capsule; Take 20 mg by mouth daily  -     ALPRAZolam (XANAX) 0 25 mg tablet; Take by mouth daily at bedtime as needed for anxiety        Genetic testing results negative - no clinically significant mutations identified  No variants of uncertain significance identified  [facility] lifetime breast cancer riskScore calculated at 9 9%, with a 5 year risk of 0 9%  This is below the average lifetime breast cancer risk for women in the patient's age group of 12 0%  [facility] lifetime breast cancer risk calculated at 10 6%, with a 5 year risk of 1%  Both scores are below the 20% threshold for breast cancer screening management changes  Order for 3D mammogram entered at patient's request   Explained that she should contact her insurance company to verify coverage before going for 3D screening  Counseled patient that a negative result does not mean that she will never get cancer; only that she is not at increased risk for certain types of cancer due to mutations on the genes analyzed  Also discussed that just because patient is negative, it does not mean that other family members are negative as well  They should still discuss the appropriateness of testing with their healthcare providers  Referred patient to [facility] line if she has further questions regarding the genes analyzed, or the testing itself  Patient's colposcopy was negative  Discussed positive HPV result on Pap  Will continue to monitor vaginal Paps due to patient history      If no problems, patient to return for next regularly scheduled f/u  Time of visit 15 minutes; >50% spent counseling  Subjective:      Patient ID: Yi Juan is a 53 y o  female  Patient is here for genetic testing results  No changes since her last visit  States she was told by her PCP that she should have a 3D mammogram because of breast density  Has questions regarding colposcopy results  The following portions of the patient's history were reviewed and updated as appropriate: allergies, current medications, past family history, past medical history, past social history, past surgical history and problem list     Review of Systems   Genitourinary: Negative  Objective:      /64   Wt 64 7 kg (142 lb 9 6 oz)   BMI 24 48 kg/m²          Physical Exam   Constitutional: She is oriented to person, place, and time  Vital signs are normal  She appears well-developed and well-nourished  Neurological: She is alert and oriented to person, place, and time  Psychiatric: She has a normal mood and affect  Her behavior is normal  Judgment and thought content normal    Vitals reviewed

## 2019-01-03 ENCOUNTER — OFFICE VISIT (OUTPATIENT)
Dept: BEHAVIORAL/MENTAL HEALTH CLINIC | Facility: CLINIC | Age: 49
End: 2019-01-03
Payer: COMMERCIAL

## 2019-01-03 DIAGNOSIS — F33.1 MAJOR DEPRESSIVE DISORDER, RECURRENT EPISODE, MODERATE (HCC): Primary | ICD-10-CM

## 2019-01-03 PROCEDURE — 90834 PSYTX W PT 45 MINUTES: CPT | Performed by: SOCIAL WORKER

## 2019-01-03 NOTE — PSYCH
Treatment Plan Tracking    Treatment Plan not completed within required time limits due to: Treatment plan created verbally  PT will sign at her next session

## 2019-01-03 NOTE — PSYCH
Linda Howell  1970       Date of Initial Treatment Plan: 2/20/15   Date of Current Treatment Plan: 01/03/19         Strengths/Personal Resources for Self Care: Embracing my valdez, strong, good decision making, healthy relationships, looking at the big picture            Diagnosis:   No diagnosis found  Area of Needs:   Relationships       Long Term Goal 1: Form and maintain heathy relationships in my life     Target Date: 4/3/19  Completion Date:  TBD    Short Term Objectives for Goal 1:    Maintain healthy boundaries   Communicate what I'm feeling   Continue to be true to myself        GOAL 1: Modality: Individual 1-2x per month   Completion Date TBD and The person(s) responsible for carrying out the plan is  Linda        Behavioral Health Treatment Plan [facility]: Diagnosis and Treatment Plan explained to Burns Harris relates understanding diagnosis and is agreeable to Treatment Plan         Client Comments : Please share your thoughts, feelings, need and/or experiences regarding your treatment plan:       __________________________________________________________________    __________________________________________________________________    __________________________________________________________________    __________________________________________________________________    _______________________________________                Patient signature, Date Time: __________________________________________             Physician cosigner signature, Date, Time: ________________________________

## 2019-01-03 NOTE — PSYCH
Psychotherapy Provided: Individual Psychotherapy 45 minutes     Length of time in session: 45 minutes, follow up in 2 week    Goals addressed in session: Goal 1     Pain:      none    0    Current suicide risk : Low     D- Linda stated that she enjoyed the holiday season  She stated that she worked with her PCP regarding medication and is currently taking Cymbalta  She also stated that she would still like to consult with a psychiatrist  She had her PCP complete [facility] testing  She discussed her relationship with her boyfriend as well as her family  She stated that she has been attempting to remain positive in her interactions  Continuing to work on building and maintaining healthy relationships in her life  Reviewing and renewing her treatment plan  Giving supportive therapy  A- Progress - Continuing to process her emotions  P-Continue treatment    [address]: Diagnosis and Treatment Plan explained to Isaias Alcantar relates understanding diagnosis and is agreeable to Treatment Plan   Yes

## 2019-01-04 ENCOUNTER — TELEPHONE (OUTPATIENT)
Dept: OBGYN CLINIC | Facility: CLINIC | Age: 49
End: 2019-01-04

## 2019-04-25 ENCOUNTER — HOSPITAL ENCOUNTER (OUTPATIENT)
Dept: MAMMOGRAPHY | Facility: CLINIC | Age: 49
Discharge: HOME | End: 2019-04-25
Payer: COMMERCIAL

## 2019-04-25 DIAGNOSIS — Z12.31 VISIT FOR SCREENING MAMMOGRAM: ICD-10-CM

## 2019-04-25 PROCEDURE — 77063 BREAST TOMOSYNTHESIS BI: CPT

## 2019-04-25 PROCEDURE — 77067 SCR MAMMO BI INCL CAD: CPT

## 2019-04-29 ENCOUNTER — OFFICE VISIT (OUTPATIENT)
Dept: BEHAVIORAL/MENTAL HEALTH CLINIC | Facility: CLINIC | Age: 49
End: 2019-04-29
Payer: COMMERCIAL

## 2019-04-29 DIAGNOSIS — F33.1 MAJOR DEPRESSIVE DISORDER, RECURRENT EPISODE, MODERATE (HCC): Primary | ICD-10-CM

## 2019-04-29 PROCEDURE — 90834 PSYTX W PT 45 MINUTES: CPT | Performed by: SOCIAL WORKER

## 2019-05-14 ENCOUNTER — TELEPHONE (OUTPATIENT)
Dept: CARDIOLOGY CLINIC | Facility: CLINIC | Age: 49
End: 2019-05-14

## 2019-05-20 ENCOUNTER — OFFICE VISIT (OUTPATIENT)
Dept: CARDIOLOGY CLINIC | Facility: CLINIC | Age: 49
End: 2019-05-20
Payer: COMMERCIAL

## 2019-05-20 VITALS
SYSTOLIC BLOOD PRESSURE: 102 MMHG | HEIGHT: 64 IN | OXYGEN SATURATION: 99 % | BODY MASS INDEX: 25.85 KG/M2 | DIASTOLIC BLOOD PRESSURE: 60 MMHG | WEIGHT: 151.4 LBS | HEART RATE: 77 BPM

## 2019-05-20 DIAGNOSIS — E78.00 HYPERCHOLESTEREMIA: ICD-10-CM

## 2019-05-20 DIAGNOSIS — R42 LIGHTHEADEDNESS: Primary | ICD-10-CM

## 2019-05-20 PROCEDURE — 99244 OFF/OP CNSLTJ NEW/EST MOD 40: CPT | Performed by: INTERNAL MEDICINE

## 2019-05-20 PROCEDURE — 93000 ELECTROCARDIOGRAM COMPLETE: CPT | Performed by: INTERNAL MEDICINE

## 2019-07-01 ENCOUNTER — SOCIAL WORK (OUTPATIENT)
Dept: BEHAVIORAL/MENTAL HEALTH CLINIC | Facility: CLINIC | Age: 49
End: 2019-07-01
Payer: COMMERCIAL

## 2019-07-01 DIAGNOSIS — F33.1 MAJOR DEPRESSIVE DISORDER, RECURRENT EPISODE, MODERATE (HCC): Primary | ICD-10-CM

## 2019-07-01 PROCEDURE — 90834 PSYTX W PT 45 MINUTES: CPT | Performed by: SOCIAL WORKER

## 2019-07-01 NOTE — PSYCH
Psychotherapy Provided: Individual Psychotherapy 45 minutes     Length of time in session: 45 minutes, follow up in 1 month    Goals addressed in session: Goal 1     Pain:      none    0    Current suicide risk : Low     D- Linda stated that she is having issues with her sister  She stated that she has been disincluding her in things and continues to keep in touch with her ex-boyfriend and invite him to family events  She stated that this has been very difficult for her  Processing her emotions and discussing ways for her to cope with her family members behavior  Also discussing being around others that treat her kindly and setting and maintaining boundaries with her family members  Giving supportive therapy  A- Progress - Continuing to process her emotions  P-Continue treatment    [address]: Diagnosis and Treatment Plan explained to Jenise Chaidez relates understanding diagnosis and is agreeable to Treatment Plan   Yes

## 2019-07-01 NOTE — PSYCH
Treatment Plan Tracking    Treatment Plan not completed within required time limits due to: Treatment plan not signed due to technical difficulties

## 2019-08-26 ENCOUNTER — SOCIAL WORK (OUTPATIENT)
Dept: BEHAVIORAL/MENTAL HEALTH CLINIC | Facility: CLINIC | Age: 49
End: 2019-08-26
Payer: COMMERCIAL

## 2019-08-26 DIAGNOSIS — F33.1 MAJOR DEPRESSIVE DISORDER, RECURRENT EPISODE, MODERATE (HCC): Primary | ICD-10-CM

## 2019-08-26 PROCEDURE — 90834 PSYTX W PT 45 MINUTES: CPT | Performed by: SOCIAL WORKER

## 2019-08-26 NOTE — PSYCH
Treatment Plan Tracking    Treatment Plan not completed within required time limits due to: Treatment plan late due to gap between sessions created verbally will be signed at her next session

## 2019-08-26 NOTE — PSYCH
Psychotherapy Provided: Individual Psychotherapy 45 minutes     Length of time in session: 45 minutes, follow up in 2 month    Goals addressed in session: Goal 1     Pain:      none    0    Current suicide risk : Low     D- Linda stated that she has struggled somewhat with her depression  She stated that she has "down" moments but that she tries to face them with a positive attitude  She discussed her ongoing issues with her family and her efforts to set and maintain healthy boundaries with them  She also discussed her relationship with her boyfriend and some of the challenges that they face together  Discussing ways for her to be able to continue to face her challenges and problem solve the issues  Also continuing to work on maintaining a positive attitude  Reviewing and renewing her treatment plan  Giving supportive therapy  A- Progress - Continuing to process her emotions  P-Continue treatment    [address]: Diagnosis and Treatment Plan explained to Alberto Mascorro relates understanding diagnosis and is agreeable to Treatment Plan   Yes

## 2019-08-26 NOTE — BH TREATMENT PLAN
Linda Bains  1970       Date of Initial Treatment Plan: 2/20/15   Date of Current Treatment Plan: 08/26/19    Strengths/Personal Resources for Self Care: Embracing my valdez, strong, good decision making, healthy relationships, looking at the big picture             Diagnosis:   No diagnosis found      Area of Needs:   Relationships        Long Term Goal 1: Form and maintain heathy relationships in my life      Target Date: 11/26/19  Completion Date:  TBD     Short Term Objectives for Goal 1:    Maintain healthy boundaries   Communicate what I'm feeling   Continue to be true to myself           GOAL 1: Modality: Individual 1-2x per month   Completion Date TBD and The person(s) responsible for carrying out the plan is  Linda  Behavioral Health Treatment Plan [facility]: Diagnosis and Treatment Plan explained to Luigi Arenas relates understanding diagnosis and is agreeable to Treatment Plan         Client Comments : Please share your thoughts, feelings, need and/or experiences regarding your treatment plan: None

## 2019-11-14 ENCOUNTER — APPOINTMENT (OUTPATIENT)
Dept: LAB | Facility: CLINIC | Age: 49
End: 2019-11-14
Payer: COMMERCIAL

## 2019-11-14 ENCOUNTER — TRANSCRIBE ORDERS (OUTPATIENT)
Dept: LAB | Facility: CLINIC | Age: 49
End: 2019-11-14

## 2019-11-14 DIAGNOSIS — R10.9 ABDOMINAL PAIN, UNSPECIFIED ABDOMINAL LOCATION: ICD-10-CM

## 2019-11-14 DIAGNOSIS — R10.9 ABDOMINAL PAIN, UNSPECIFIED ABDOMINAL LOCATION: Primary | ICD-10-CM

## 2019-11-14 LAB
ALBUMIN SERPL BCP-MCNC: 4.2 G/DL (ref 3.5–5)
ALP SERPL-CCNC: 45 U/L (ref 46–116)
ALT SERPL W P-5'-P-CCNC: 17 U/L (ref 12–78)
ANION GAP SERPL CALCULATED.3IONS-SCNC: 6 MMOL/L (ref 4–13)
AST SERPL W P-5'-P-CCNC: 10 U/L (ref 5–45)
BASOPHILS # BLD AUTO: 0.05 THOUSANDS/ΜL (ref 0–0.1)
BASOPHILS NFR BLD AUTO: 1 % (ref 0–1)
BILIRUB SERPL-MCNC: 0.71 MG/DL (ref 0.2–1)
BUN SERPL-MCNC: 15 MG/DL (ref 5–25)
CALCIUM SERPL-MCNC: 8.8 MG/DL (ref 8.3–10.1)
CHLORIDE SERPL-SCNC: 107 MMOL/L (ref 100–108)
CO2 SERPL-SCNC: 30 MMOL/L (ref 21–32)
CREAT SERPL-MCNC: 0.84 MG/DL (ref 0.6–1.3)
CRP SERPL QL: <3 MG/L
EOSINOPHIL # BLD AUTO: 0.23 THOUSAND/ΜL (ref 0–0.61)
EOSINOPHIL NFR BLD AUTO: 5 % (ref 0–6)
ERYTHROCYTE [DISTWIDTH] IN BLOOD BY AUTOMATED COUNT: 11.8 % (ref 11.6–15.1)
ERYTHROCYTE [SEDIMENTATION RATE] IN BLOOD: 7 MM/HOUR (ref 0–20)
GFR SERPL CREATININE-BSD FRML MDRD: 82 ML/MIN/1.73SQ M
GLUCOSE P FAST SERPL-MCNC: 91 MG/DL (ref 65–99)
HCT VFR BLD AUTO: 43.9 % (ref 34.8–46.1)
HGB BLD-MCNC: 14.5 G/DL (ref 11.5–15.4)
IGA SERPL-MCNC: 112 MG/DL (ref 70–400)
IMM GRANULOCYTES # BLD AUTO: 0.01 THOUSAND/UL (ref 0–0.2)
IMM GRANULOCYTES NFR BLD AUTO: 0 % (ref 0–2)
LYMPHOCYTES # BLD AUTO: 1.81 THOUSANDS/ΜL (ref 0.6–4.47)
LYMPHOCYTES NFR BLD AUTO: 36 % (ref 14–44)
MCH RBC QN AUTO: 31 PG (ref 26.8–34.3)
MCHC RBC AUTO-ENTMCNC: 33 G/DL (ref 31.4–37.4)
MCV RBC AUTO: 94 FL (ref 82–98)
MONOCYTES # BLD AUTO: 0.37 THOUSAND/ΜL (ref 0.17–1.22)
MONOCYTES NFR BLD AUTO: 7 % (ref 4–12)
NEUTROPHILS # BLD AUTO: 2.63 THOUSANDS/ΜL (ref 1.85–7.62)
NEUTS SEG NFR BLD AUTO: 51 % (ref 43–75)
NRBC BLD AUTO-RTO: 0 /100 WBCS
PLATELET # BLD AUTO: 153 THOUSANDS/UL (ref 149–390)
PMV BLD AUTO: 11.1 FL (ref 8.9–12.7)
POTASSIUM SERPL-SCNC: 4.4 MMOL/L (ref 3.5–5.3)
PROT SERPL-MCNC: 7 G/DL (ref 6.4–8.2)
RBC # BLD AUTO: 4.68 MILLION/UL (ref 3.81–5.12)
SODIUM SERPL-SCNC: 143 MMOL/L (ref 136–145)
TSH SERPL DL<=0.05 MIU/L-ACNC: 1.4 UIU/ML (ref 0.36–3.74)
WBC # BLD AUTO: 5.1 THOUSAND/UL (ref 4.31–10.16)

## 2019-11-14 PROCEDURE — 80053 COMPREHEN METABOLIC PANEL: CPT

## 2019-11-14 PROCEDURE — 84443 ASSAY THYROID STIM HORMONE: CPT

## 2019-11-14 PROCEDURE — 82784 ASSAY IGA/IGD/IGG/IGM EACH: CPT

## 2019-11-14 PROCEDURE — 86140 C-REACTIVE PROTEIN: CPT

## 2019-11-14 PROCEDURE — 85025 COMPLETE CBC W/AUTO DIFF WBC: CPT

## 2019-11-14 PROCEDURE — 83516 IMMUNOASSAY NONANTIBODY: CPT

## 2019-11-14 PROCEDURE — 85652 RBC SED RATE AUTOMATED: CPT

## 2019-11-14 PROCEDURE — 36415 COLL VENOUS BLD VENIPUNCTURE: CPT

## 2019-11-15 LAB
GLIADIN PEPTIDE IGA SER-ACNC: 4 UNITS (ref 0–19)
GLIADIN PEPTIDE IGG SER-ACNC: 3 UNITS (ref 0–19)

## 2019-11-20 ENCOUNTER — SOCIAL WORK (OUTPATIENT)
Dept: BEHAVIORAL/MENTAL HEALTH CLINIC | Facility: CLINIC | Age: 49
End: 2019-11-20
Payer: COMMERCIAL

## 2019-11-20 DIAGNOSIS — F33.1 MAJOR DEPRESSIVE DISORDER, RECURRENT EPISODE, MODERATE (HCC): Primary | ICD-10-CM

## 2019-11-20 PROCEDURE — 90834 PSYTX W PT 45 MINUTES: CPT | Performed by: SOCIAL WORKER

## 2019-11-21 NOTE — PSYCH
Psychotherapy Provided: Individual Psychotherapy 45 minutes     Length of time in session: 45 minutes, follow up in 2 month    Goals addressed in session: Goal 1     Pain:      none    0    Current suicide risk : Low     D- Linda stated that she is struggling her relationships with her family members  She stated that they have been acting in a condescending manner towards her  She stated that she feels that they focused on material things as well as wealth  She stated that this continues to be very difficult for her because she dos not hold the same values that they do  In addition, she stated that they continues to have a relationship with her ex-boyfriend based on these values  Processing her emotions and discussing ways for her to set and maintain values in these relationships  Also discuss ing the importance of upholding her values  Giving supportive therapy  A- Progress- Linda presented as frustrated  She continues to work towards completing her treatment goals  P-Continue treatment    [address]: Diagnosis and Treatment Plan explained to Kiya Jefferson relates understanding diagnosis and is agreeable to Treatment Plan   Yes

## 2020-01-27 ENCOUNTER — ANNUAL EXAM (OUTPATIENT)
Dept: OBGYN CLINIC | Facility: CLINIC | Age: 50
End: 2020-01-27
Payer: COMMERCIAL

## 2020-01-27 VITALS — BODY MASS INDEX: 26.61 KG/M2 | SYSTOLIC BLOOD PRESSURE: 100 MMHG | WEIGHT: 155 LBS | DIASTOLIC BLOOD PRESSURE: 64 MMHG

## 2020-01-27 DIAGNOSIS — Z12.39 SCREENING FOR BREAST CANCER: ICD-10-CM

## 2020-01-27 DIAGNOSIS — B00.9 HERPES: ICD-10-CM

## 2020-01-27 DIAGNOSIS — R87.621 PAP SMEAR OF VAGINA WITH ASC-H: ICD-10-CM

## 2020-01-27 DIAGNOSIS — Z01.419 ENCOUNTER FOR GYNECOLOGICAL EXAMINATION WITHOUT ABNORMAL FINDING: Primary | ICD-10-CM

## 2020-01-27 PROCEDURE — 99396 PREV VISIT EST AGE 40-64: CPT | Performed by: OBSTETRICS & GYNECOLOGY

## 2020-01-27 PROCEDURE — 87624 HPV HI-RISK TYP POOLED RSLT: CPT | Performed by: OBSTETRICS & GYNECOLOGY

## 2020-01-27 PROCEDURE — G0145 SCR C/V CYTO,THINLAYER,RESCR: HCPCS | Performed by: OBSTETRICS & GYNECOLOGY

## 2020-01-27 RX ORDER — CYCLOBENZAPRINE HCL 10 MG
TABLET ORAL
COMMUNITY
Start: 2016-04-04 | End: 2021-03-01

## 2020-01-27 RX ORDER — VALACYCLOVIR HYDROCHLORIDE 500 MG/1
500 TABLET, FILM COATED ORAL DAILY
Qty: 84 TABLET | Refills: 3 | Status: SHIPPED | OUTPATIENT
Start: 2020-01-27 | End: 2021-03-01

## 2020-01-27 RX ORDER — ESCITALOPRAM OXALATE 20 MG/1
TABLET ORAL DAILY
COMMUNITY
Start: 2016-02-09 | End: 2021-03-01

## 2020-01-27 RX ORDER — FLUTICASONE PROPIONATE 50 MCG
SPRAY, SUSPENSION (ML) NASAL
COMMUNITY
Start: 2014-04-22 | End: 2021-03-01

## 2020-01-27 RX ORDER — VALACYCLOVIR HYDROCHLORIDE 500 MG/1
500 TABLET, FILM COATED ORAL DAILY
Qty: 84 TABLET | Refills: 2 | Status: CANCELLED | OUTPATIENT
Start: 2020-01-27 | End: 2020-01-28

## 2020-01-27 RX ORDER — OCTISALATE, AVOBENZONE, HOMOSALATE, AND OCTOCRYLENE 29.4; 29.4; 49; 25.48 MG/ML; MG/ML; MG/ML; MG/ML
LOTION TOPICAL
COMMUNITY
End: 2021-03-01

## 2020-01-27 NOTE — PROGRESS NOTES
Assessment/Plan:    No problem-specific Assessment & Plan notes found for this encounter  Normal gynecological physical examination  Self-breast examination stressed  Mammogram ordered  Discussed regular exercise, healthy diet, importance of vitamin D and calcium supplements  Discussed importance of sun block use during periods of prolonged sun exposure  Patient will be seen in 1 year for routine gynecologic and medical examination  Patient will call office for any problems, concerns, or issues which may arise during the interim  Diagnoses and all orders for this visit:    Encounter for gynecological examination without abnormal finding  -     Cancel: Liquid-based pap, screening  -     Liquid-based pap, screening    Screening for breast cancer  -     Mammo screening bilateral w 3d & cad; Future    Herpes    Other orders  -     fluticasone (FLONASE) 50 mcg/act nasal spray; spray 1 spray by intranasal route  every day in each nostril  -     cyclobenzaprine (FLEXERIL) 10 mg tablet; take 1/2 to 1 tablet po qhs prn  -     escitalopram (LEXAPRO) 20 mg tablet; Take by mouth Daily  -     Ergocalciferol (VITAMIN D2 PO); Vitamin D2 1,250 mcg (50,000 unit) capsule   TAKE 1 CAPSULE BY MOUTH WEEKLY FOR 4 WEEKS THEN TAKE 1 CAPSULE EVERY 2 WEEKS  -     Probiotic Product (ALIGN) 4 MG CAPS; Align 4 mg capsule   once daily  -     Cancel: valACYclovir (VALTREX) 500 mg tablet; Take 1 tablet (500 mg total) by mouth daily for 1 day        Subjective:      Patient ID: Xavier Queen is a 53 y o  female  Patient is a 23-year-old female who presents today for her annual gynecologic and medical examination    Patient is status post hysterectomy and removal of 1 ovary with no bleeding at this time    Patient denies any significant vasomotor symptoms as well and is using natural replacement treatments to curb any hot flashes      Patient reports normal bowel and bladder habits    She denies any significant pelvic or abdominal pain    She reports occasional headaches but nothing on a regular basis  She denies any chest pain, shortness of breath, fever, shakes or chills  She is not being followed for any significant medical problems at this time    Importance of self-breast examination with stressed to the patient at today's visit she is up-to-date with screening mammography  Appropriate arrangements for screening mammogram replaced into the EMR system at today's visit  The following portions of the patient's history were reviewed and updated as appropriate: allergies, current medications, past family history, past medical history, past social history, past surgical history and problem list     Review of Systems   Constitutional: Negative  Negative for appetite change, diaphoresis, fatigue, fever and unexpected weight change  HENT: Negative  Eyes: Negative  Respiratory: Negative  Cardiovascular: Negative  Gastrointestinal: Negative  Negative for abdominal pain, blood in stool, constipation, diarrhea, nausea and vomiting  Endocrine: Negative  Negative for cold intolerance and heat intolerance  Genitourinary: Negative  Negative for dysuria, frequency, hematuria, urgency, vaginal bleeding, vaginal discharge and vaginal pain  Musculoskeletal: Negative  Skin: Negative  Allergic/Immunologic: Negative  Neurological: Negative  Hematological: Negative  Negative for adenopathy  Psychiatric/Behavioral: Negative  Objective:      /64   Wt 70 3 kg (155 lb)   BMI 26 61 kg/m²          Physical Exam   Constitutional: She appears well-developed  HENT:   Head: Normocephalic  Eyes: Pupils are equal, round, and reactive to light  Neck: Normal range of motion  Neck supple  Cardiovascular: Normal rate and regular rhythm  Pulmonary/Chest: Effort normal and breath sounds normal  Right breast exhibits no inverted nipple, no mass, no nipple discharge, no skin change and no tenderness  Left breast exhibits no inverted nipple, no mass, no nipple discharge, no skin change and no tenderness  Breasts are symmetrical    Abdominal: Soft  Normal appearance and bowel sounds are normal    Genitourinary: Rectum normal and vagina normal  Pelvic exam was performed with patient supine  There is no rash or lesion on the right labia  There is no rash or lesion on the left labia  Right adnexum displays no mass, no tenderness and no fullness  Left adnexum displays no mass, no tenderness and no fullness  No erythema, tenderness or bleeding in the vagina  No vaginal discharge found  Genitourinary Comments: Patient is status post hysterectomy with removal of 1 ovary  Pelvic examination revealed good pelvic support   Lymphadenopathy:     She has no cervical adenopathy  She has no axillary adenopathy  No inguinal adenopathy noted on the right or left side  Right: No inguinal and no supraclavicular adenopathy present  Left: No inguinal and no supraclavicular adenopathy present  Neurological: She is alert  Skin: Skin is intact  No rash noted  Psychiatric: She has a normal mood and affect   Her speech is normal and behavior is normal  Judgment and thought content normal  Cognition and memory are normal

## 2020-01-28 LAB
HPV HR 12 DNA CVX QL NAA+PROBE: NEGATIVE
HPV16 DNA CVX QL NAA+PROBE: NEGATIVE
HPV18 DNA CVX QL NAA+PROBE: NEGATIVE

## 2020-01-30 LAB
LAB AP GYN PRIMARY INTERPRETATION: NORMAL
Lab: NORMAL

## 2020-05-21 ENCOUNTER — HOSPITAL ENCOUNTER (OUTPATIENT)
Dept: MAMMOGRAPHY | Facility: CLINIC | Age: 50
Discharge: HOME | End: 2020-05-21
Payer: COMMERCIAL

## 2020-05-21 VITALS — WEIGHT: 155 LBS | HEIGHT: 64 IN | BODY MASS INDEX: 26.46 KG/M2

## 2020-05-21 DIAGNOSIS — Z12.39 SCREENING FOR BREAST CANCER: ICD-10-CM

## 2020-05-21 PROCEDURE — 77067 SCR MAMMO BI INCL CAD: CPT

## 2020-05-21 PROCEDURE — 77063 BREAST TOMOSYNTHESIS BI: CPT

## 2020-11-17 ENCOUNTER — TELEPHONE (OUTPATIENT)
Dept: PSYCHIATRY | Facility: CLINIC | Age: 50
End: 2020-11-17

## 2021-02-11 ENCOUNTER — OFFICE VISIT (OUTPATIENT)
Dept: PODIATRY | Facility: CLINIC | Age: 51
End: 2021-02-11
Payer: COMMERCIAL

## 2021-02-11 VITALS
HEIGHT: 64 IN | HEART RATE: 79 BPM | BODY MASS INDEX: 28.68 KG/M2 | SYSTOLIC BLOOD PRESSURE: 124 MMHG | DIASTOLIC BLOOD PRESSURE: 79 MMHG | WEIGHT: 168 LBS

## 2021-02-11 DIAGNOSIS — M79.672 LEFT FOOT PAIN: ICD-10-CM

## 2021-02-11 DIAGNOSIS — M77.32 CALCANEAL SPUR OF LEFT FOOT: ICD-10-CM

## 2021-02-11 DIAGNOSIS — M72.2 PLANTAR FASCIITIS: Primary | ICD-10-CM

## 2021-02-11 PROCEDURE — 20550 NJX 1 TENDON SHEATH/LIGAMENT: CPT | Performed by: PODIATRIST

## 2021-02-11 PROCEDURE — 99243 OFF/OP CNSLTJ NEW/EST LOW 30: CPT | Performed by: PODIATRIST

## 2021-02-11 RX ORDER — MELOXICAM 15 MG/1
15 TABLET ORAL DAILY
Qty: 30 TABLET | Refills: 0 | Status: SHIPPED | OUTPATIENT
Start: 2021-02-11 | End: 2021-06-01

## 2021-02-11 RX ORDER — TRIAMCINOLONE ACETONIDE 40 MG/ML
20 INJECTION, SUSPENSION INTRA-ARTICULAR; INTRAMUSCULAR ONCE
Status: COMPLETED | OUTPATIENT
Start: 2021-02-11 | End: 2021-02-11

## 2021-02-11 RX ORDER — LIDOCAINE HYDROCHLORIDE 10 MG/ML
1 INJECTION, SOLUTION EPIDURAL; INFILTRATION; INTRACAUDAL; PERINEURAL ONCE
Status: COMPLETED | OUTPATIENT
Start: 2021-02-11 | End: 2021-02-11

## 2021-02-11 RX ORDER — BUPIVACAINE HYDROCHLORIDE 5 MG/ML
1 INJECTION, SOLUTION EPIDURAL; INTRACAUDAL ONCE
Status: COMPLETED | OUTPATIENT
Start: 2021-02-11 | End: 2021-02-11

## 2021-02-11 RX ADMIN — LIDOCAINE HYDROCHLORIDE 1 ML: 10 INJECTION, SOLUTION EPIDURAL; INFILTRATION; INTRACAUDAL; PERINEURAL at 08:48

## 2021-02-11 RX ADMIN — TRIAMCINOLONE ACETONIDE 20 MG: 40 INJECTION, SUSPENSION INTRA-ARTICULAR; INTRAMUSCULAR at 08:49

## 2021-02-11 RX ADMIN — BUPIVACAINE HYDROCHLORIDE 1 ML: 5 INJECTION, SOLUTION EPIDURAL; INTRACAUDAL at 08:48

## 2021-02-11 NOTE — LETTER
February 11, 2021     Eric Gaviria DO  [id number] [facility] [address]    Patient: Eugenie Oconnor   YOB: 1970   Date of Visit: 2/11/2021       Dear Dr Leidy Haskins:    Thank you for referring Malka Apley to me for evaluation  Below are my notes for this consultation  If you have questions, please do not hesitate to call me  I look forward to following your patient along with you  Sincerely,        Fernando Robert DPM        CC: No Recipients  Ramone Maier  2/11/2021  9:01 AM  Signed  Assessment/Plan:      I personally reviewed the x-rays of the left heel  They are positive for an inferior heel spur  No evidence of fracture  Explained to patient that she is dealing with heel spur syndrome and plantar fasciitis  Discussed treatment options in detail  Advised patient on proper shoe gear and stretching exercises  Injected left heel with 0 5 cc Kenalog 40 along with 1 cc 1% xylocaine and 1 cc 0 5% Marcaine  Patient placed on meloxicam 15 mg daily  No problem-specific Assessment & Plan notes found for this encounter  Diagnoses and all orders for this visit:    Plantar fasciitis  -     X-ray calcaneus left 2+ views; Future  -     bupivacaine (PF) (MARCAINE) 0 5 % injection 1 mL  -     lidocaine (PF) (XYLOCAINE-MPF) 1 % injection 1 mL  -     triamcinolone acetonide (KENALOG-40) 40 mg/mL injection 20 mg  -     meloxicam (MOBIC) 15 mg tablet; Take 1 tablet (15 mg total) by mouth daily    Left foot pain    Calcaneal spur of left foot          Subjective:      Patient ID: Eugenie Oconnor is a 49 y o  female  HPI      patient, 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 of 6 month duration  Pain began without recalled trauma  Symptoms of post static dyskinesia related  No right heel pain present  Patient states that she had similar pain approximately 20 years that responded well to supportive shoes    Patient states that she is an avid walker  She has not taken any medication for the discomfort at this time  The following portions of the patient's history were reviewed and updated as appropriate: allergies, current medications, past family history, past medical history, past social history, past surgical history and problem list     Review of Systems   Respiratory: Negative  Cardiovascular: Negative  Gastrointestinal: Negative  Musculoskeletal: Negative  Neurological: Negative  Objective:      /79   Pulse 79   Ht 5' 4" (1 626 m) Comment: verbal  Wt 76 2 kg (168 lb)   BMI 28 84 kg/m²          Physical Exam  Constitutional:       Appearance: Normal appearance  Cardiovascular:      Pulses: Normal pulses  Musculoskeletal:         General: Tenderness present  Comments: Pain with palpation medial aspect left heel at fascia insertion into the calcaneus  No bruising or swelling noted  No pain with side to side pressure on the heel  Skin:     General: Skin is warm  Neurological:      General: No focal deficit present  Mental Status: She is oriented to person, place, and time  Comments: Tinel sign is negative for tarsal tunnel syndrome               Foot injection     Date/Time 2/11/2021 8:59 AM     Performed by  Rachelle Flores DPM     Authorized by Rachelle Flroes DPM      Universal Protocol   Consent: Verbal consent obtained  Risks and benefits: risks, benefits and alternatives were discussed  Consent given by: patient  Patient understanding: patient states understanding of the procedure being performed  Patient identity confirmed: verbally with patient        Local anesthesia used: yes     Anesthesia   Local anesthesia used: yes  Local Anesthetic: lidocaine 1% without epinephrine and bupivacaine 0 5% without epinephrine     Procedure Details   Procedure Notes:   Injected left heel with 0 5 cc Kenalog 40 along with 1 cc 1% xylocaine and 1 cc 0 5% Marcaine

## 2021-02-11 NOTE — PROGRESS NOTES
Assessment/Plan:      I personally reviewed the x-rays of the left heel  They are positive for an inferior heel spur  No evidence of fracture  Explained to patient that she is dealing with heel spur syndrome and plantar fasciitis  Discussed treatment options in detail  Advised patient on proper shoe gear and stretching exercises  Injected left heel with 0 5 cc Kenalog 40 along with 1 cc 1% xylocaine and 1 cc 0 5% Marcaine  Patient placed on meloxicam 15 mg daily  No problem-specific Assessment & Plan notes found for this encounter  Diagnoses and all orders for this visit:    Plantar fasciitis  -     X-ray calcaneus left 2+ views; Future  -     bupivacaine (PF) (MARCAINE) 0 5 % injection 1 mL  -     lidocaine (PF) (XYLOCAINE-MPF) 1 % injection 1 mL  -     triamcinolone acetonide (KENALOG-40) 40 mg/mL injection 20 mg  -     meloxicam (MOBIC) 15 mg tablet; Take 1 tablet (15 mg total) by mouth daily    Left foot pain    Calcaneal spur of left foot          Subjective:      Patient ID: Sanket Cesar is a 49 y o  female  HPI      patient, 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 of 6 month duration  Pain began without recalled trauma  Symptoms of post static dyskinesia related  No right heel pain present  Patient states that she had similar pain approximately 20 years that responded well to supportive shoes  Patient states that she is an avid walker  She has not taken any medication for the discomfort at this time  The following portions of the patient's history were reviewed and updated as appropriate: allergies, current medications, past family history, past medical history, past social history, past surgical history and problem list     Review of Systems   Respiratory: Negative  Cardiovascular: Negative  Gastrointestinal: Negative  Musculoskeletal: Negative  Neurological: Negative            Objective:      /79   Pulse 79   Ht 5' 4" (1 626 m) Comment: verbal  Wt 76 2 kg (168 lb)   BMI 28 84 kg/m²          Physical Exam  Constitutional:       Appearance: Normal appearance  Cardiovascular:      Pulses: Normal pulses  Musculoskeletal:         General: Tenderness present  Comments: Pain with palpation medial aspect left heel at fascia insertion into the calcaneus  No bruising or swelling noted  No pain with side to side pressure on the heel  Skin:     General: Skin is warm  Neurological:      General: No focal deficit present  Mental Status: She is oriented to person, place, and time  Comments: Tinel sign is negative for tarsal tunnel syndrome               Foot injection     Date/Time 2/11/2021 8:59 AM     Performed by  Gurdeep Brown DPM     Authorized by Gurdeep Brown DPM      Universal Protocol   Consent: Verbal consent obtained  Risks and benefits: risks, benefits and alternatives were discussed  Consent given by: patient  Patient understanding: patient states understanding of the procedure being performed  Patient identity confirmed: verbally with patient        Local anesthesia used: yes     Anesthesia   Local anesthesia used: yes  Local Anesthetic: lidocaine 1% without epinephrine and bupivacaine 0 5% without epinephrine     Procedure Details   Procedure Notes:   Injected left heel with 0 5 cc Kenalog 40 along with 1 cc 1% xylocaine and 1 cc 0 5% Marcaine

## 2021-03-01 ENCOUNTER — OFFICE VISIT (OUTPATIENT)
Dept: PODIATRY | Facility: CLINIC | Age: 51
End: 2021-03-01
Payer: COMMERCIAL

## 2021-03-01 VITALS
DIASTOLIC BLOOD PRESSURE: 72 MMHG | WEIGHT: 165 LBS | BODY MASS INDEX: 28.17 KG/M2 | HEIGHT: 64 IN | SYSTOLIC BLOOD PRESSURE: 107 MMHG

## 2021-03-01 DIAGNOSIS — M72.2 PLANTAR FASCIITIS: Primary | ICD-10-CM

## 2021-03-01 DIAGNOSIS — M79.672 LEFT FOOT PAIN: ICD-10-CM

## 2021-03-01 PROCEDURE — 99212 OFFICE O/P EST SF 10 MIN: CPT | Performed by: PODIATRIST

## 2021-03-10 ENCOUNTER — TELEPHONE (OUTPATIENT)
Dept: GASTROENTEROLOGY | Facility: AMBULARY SURGERY CENTER | Age: 51
End: 2021-03-10

## 2021-03-10 ENCOUNTER — LAB (OUTPATIENT)
Dept: LAB | Facility: HOSPITAL | Age: 51
End: 2021-03-10
Payer: COMMERCIAL

## 2021-03-10 ENCOUNTER — TRANSCRIBE ORDERS (OUTPATIENT)
Dept: LAB | Facility: HOSPITAL | Age: 51
End: 2021-03-10

## 2021-03-10 ENCOUNTER — ANNUAL EXAM (OUTPATIENT)
Dept: OBGYN CLINIC | Facility: CLINIC | Age: 51
End: 2021-03-10
Payer: COMMERCIAL

## 2021-03-10 VITALS
BODY MASS INDEX: 28 KG/M2 | HEIGHT: 64 IN | WEIGHT: 164 LBS | DIASTOLIC BLOOD PRESSURE: 82 MMHG | SYSTOLIC BLOOD PRESSURE: 136 MMHG

## 2021-03-10 DIAGNOSIS — Z01.419 ENCOUNTER FOR GYNECOLOGICAL EXAMINATION WITHOUT ABNORMAL FINDING: Primary | ICD-10-CM

## 2021-03-10 DIAGNOSIS — Z12.11 SPECIAL SCREENING FOR MALIGNANT NEOPLASMS, COLON: ICD-10-CM

## 2021-03-10 DIAGNOSIS — R53.83 FATIGUE, UNSPECIFIED TYPE: ICD-10-CM

## 2021-03-10 DIAGNOSIS — E55.9 AVITAMINOSIS D: ICD-10-CM

## 2021-03-10 DIAGNOSIS — Z12.31 ENCOUNTER FOR SCREENING MAMMOGRAM FOR MALIGNANT NEOPLASM OF BREAST: ICD-10-CM

## 2021-03-10 DIAGNOSIS — E78.5 HYPERLIPIDEMIA, UNSPECIFIED HYPERLIPIDEMIA TYPE: ICD-10-CM

## 2021-03-10 DIAGNOSIS — Z12.11 SPECIAL SCREENING FOR MALIGNANT NEOPLASMS, COLON: Primary | ICD-10-CM

## 2021-03-10 LAB
25(OH)D3 SERPL-MCNC: 34 NG/ML (ref 30–100)
BASOPHILS # BLD AUTO: 0.02 THOUSANDS/ΜL (ref 0–0.1)
BASOPHILS NFR BLD AUTO: 0 % (ref 0–1)
CHOLEST SERPL-MCNC: 207 MG/DL (ref 50–200)
EOSINOPHIL # BLD AUTO: 0.18 THOUSAND/ΜL (ref 0–0.61)
EOSINOPHIL NFR BLD AUTO: 4 % (ref 0–6)
ERYTHROCYTE [DISTWIDTH] IN BLOOD BY AUTOMATED COUNT: 12.9 % (ref 11.6–15.1)
HCT VFR BLD AUTO: 38.8 % (ref 34.8–46.1)
HDLC SERPL-MCNC: 70 MG/DL
HGB BLD-MCNC: 12.8 G/DL (ref 11.5–15.4)
IMM GRANULOCYTES # BLD AUTO: 0.01 THOUSAND/UL (ref 0–0.2)
IMM GRANULOCYTES NFR BLD AUTO: 0 % (ref 0–2)
LDLC SERPL CALC-MCNC: 124 MG/DL (ref 0–100)
LYMPHOCYTES # BLD AUTO: 1.49 THOUSANDS/ΜL (ref 0.6–4.47)
LYMPHOCYTES NFR BLD AUTO: 33 % (ref 14–44)
MCH RBC QN AUTO: 31.5 PG (ref 26.8–34.3)
MCHC RBC AUTO-ENTMCNC: 33 G/DL (ref 31.4–37.4)
MCV RBC AUTO: 96 FL (ref 82–98)
MONOCYTES # BLD AUTO: 0.32 THOUSAND/ΜL (ref 0.17–1.22)
MONOCYTES NFR BLD AUTO: 7 % (ref 4–12)
NEUTROPHILS # BLD AUTO: 2.55 THOUSANDS/ΜL (ref 1.85–7.62)
NEUTS SEG NFR BLD AUTO: 56 % (ref 43–75)
NONHDLC SERPL-MCNC: 137 MG/DL
NRBC BLD AUTO-RTO: 0 /100 WBCS
PLATELET # BLD AUTO: 207 THOUSANDS/UL (ref 149–390)
PMV BLD AUTO: 9.2 FL (ref 8.9–12.7)
RBC # BLD AUTO: 4.06 MILLION/UL (ref 3.81–5.12)
TRIGL SERPL-MCNC: 66 MG/DL
TSH SERPL DL<=0.05 MIU/L-ACNC: 1 UIU/ML (ref 0.36–3.74)
WBC # BLD AUTO: 4.57 THOUSAND/UL (ref 4.31–10.16)

## 2021-03-10 PROCEDURE — 36415 COLL VENOUS BLD VENIPUNCTURE: CPT

## 2021-03-10 PROCEDURE — 80061 LIPID PANEL: CPT

## 2021-03-10 PROCEDURE — 85025 COMPLETE CBC W/AUTO DIFF WBC: CPT

## 2021-03-10 PROCEDURE — 99396 PREV VISIT EST AGE 40-64: CPT | Performed by: OBSTETRICS & GYNECOLOGY

## 2021-03-10 PROCEDURE — 84443 ASSAY THYROID STIM HORMONE: CPT

## 2021-03-10 PROCEDURE — 82306 VITAMIN D 25 HYDROXY: CPT

## 2021-03-10 RX ORDER — AMITRIPTYLINE HYDROCHLORIDE 10 MG/1
10 TABLET, FILM COATED ORAL
COMMUNITY

## 2021-03-10 RX ORDER — VALACYCLOVIR HYDROCHLORIDE 500 MG/1
500 TABLET, FILM COATED ORAL DAILY
Qty: 90 TABLET | Refills: 3 | Status: SHIPPED | OUTPATIENT
Start: 2021-03-10 | End: 2022-03-22 | Stop reason: SDUPTHER

## 2021-03-10 NOTE — PROGRESS NOTES
Assessment/Plan:    No problem-specific Assessment & Plan notes found for this encounter  Diagnoses and all orders for this visit:    Encounter for gynecological examination without abnormal finding    Encounter for screening mammogram for malignant neoplasm of breast  -     Mammo screening bilateral w 3d & cad; Future    Other orders  -     amitriptyline (ELAVIL) 10 mg tablet; Take 10 mg by mouth daily at bedtime          Normal gynecological physical examination  Self-breast examination stressed  Mammogram ordered  Discussed regular exercise, healthy diet, importance of vitamin D and calcium supplements  Discussed importance of sun block use during periods of prolonged sun exposure  Patient will be seen in 1 year for routine gynecologic and medical examination  Patient will call office for any problems, concerns, or issues which may arise during the interim  Subjective:      Patient ID: Ramon Cranker is a 49 y o  female  Patient is a 49year old female presenting to the office for yearly gynecologic examination  Patient reports only mild weight gain and fatigue  Patient denies any fevers/chills, headaches, chest pain, shortness of breath, bowel / bladder changes, or dysuria  Patient remains compliant with self-breast examinations and does not report any new masses, lesions, or nipple discharge  Patient is due for yearly mammogram and colonoscopy  Patient has a history of herpes that she takes Valtrex for  Patient still has 1 ovary remaining after her hysterectomy  The following portions of the patient's history were reviewed and updated as appropriate: allergies, current medications, past family history, past medical history, past social history, past surgical history and problem list     Review of Systems   Constitutional: Negative  Negative for appetite change, diaphoresis, fatigue, fever and unexpected weight change  HENT: Negative  Eyes: Negative      Respiratory: Negative  Cardiovascular: Negative  Gastrointestinal: Negative  Negative for abdominal pain, blood in stool, constipation, diarrhea, nausea and vomiting  Endocrine: Negative  Negative for cold intolerance and heat intolerance  Genitourinary: Negative  Negative for dysuria, frequency, hematuria, urgency, vaginal bleeding, vaginal discharge and vaginal pain  Musculoskeletal: Negative  Skin: Negative  Allergic/Immunologic: Negative  Neurological: Negative  Hematological: Negative  Negative for adenopathy  Psychiatric/Behavioral: Negative  Objective:      /82 (BP Location: Left arm, Patient Position: Sitting, Cuff Size: Standard)   Ht 5' 4" (1 626 m)   Wt 74 4 kg (164 lb)   BMI 28 15 kg/m²          Physical Exam  Constitutional:       General: She is not in acute distress  Appearance: Normal appearance  She is well-developed  She is not diaphoretic  HENT:      Head: Normocephalic and atraumatic  Eyes:      Pupils: Pupils are equal, round, and reactive to light  Neck:      Musculoskeletal: Normal range of motion and neck supple  Cardiovascular:      Rate and Rhythm: Normal rate and regular rhythm  Heart sounds: Normal heart sounds  No murmur  No friction rub  No gallop  Pulmonary:      Effort: Pulmonary effort is normal       Breath sounds: Normal breath sounds  Chest:      Breasts: Breasts are symmetrical          Right: No inverted nipple, mass, nipple discharge, skin change or tenderness  Left: No inverted nipple, mass, nipple discharge, skin change or tenderness  Abdominal:      General: Bowel sounds are normal       Palpations: Abdomen is soft  Genitourinary:     General: Normal vulva  Exam position: Supine  Labia:         Right: No rash or lesion  Left: No rash or lesion  Urethra: No urethral swelling or urethral lesion  Vagina: Normal  No vaginal discharge, erythema, tenderness or bleeding        Cervix: No discharge, lesion or erythema  Uterus: Absent  Adnexa: Right adnexa normal and left adnexa normal         Right: No mass, tenderness or fullness  Left: No mass, tenderness or fullness  Rectum: Normal  Guaiac result negative  Comments:   Good pelvic support confirmed on examination  Musculoskeletal: Normal range of motion  Lymphadenopathy:      Cervical: No cervical adenopathy  Upper Body:      Right upper body: No supraclavicular adenopathy  Left upper body: No supraclavicular adenopathy  Skin:     General: Skin is warm and dry  Findings: No rash  Neurological:      Mental Status: She is alert and oriented to person, place, and time  Psychiatric:         Speech: Speech normal          Behavior: Behavior normal          Thought Content:  Thought content normal          Judgment: Judgment normal

## 2021-03-10 NOTE — TELEPHONE ENCOUNTER
03/10/21  Screened by: Eva Griffiths    Referring Provider Dr Rip Charles: If patient answers NO to medical questions, then schedule procedure  If patient answers YES to medical questions, then schedule office appointment  Previous Colonoscopy no  Date and Facility of last colonoscopy? Comments: patient passed OA and would like to be scheduled with Dr Sincere Berry or anyone sooner  Patient can be reached at [phone]        Pre- Screening: There is no height or weight on file to calculate BMI  Has patient been referred for a routine screening Colonoscopy? yes  Is the patient between 39-70 years old? yes    SCHEDULING STAFF   If the patient is between 45yrs-49yrs, please advise patient to confirm benefits/coverage with their insurance company for a routine screening colonoscopy, some insurance carriers will only cover at [phone] or older   If the patient is over 76years old, please schedule an office visit   If the patient had a previous colonoscopy send to the procedure  before continuing    Have you been diagnosed with a bleeding disorder or anemia? no    Do you take NO    Have you been diagnosed with Diabetes or are you taking any   Diabetic medications? no    Do you have any of the following symptoms? NO    Have you had a coronary or vascular stent within the last year? no    Have you had a heart attack or stroke in the last 6 months? no    Have you had intestinal surgery in the last 3 months? no    Do you have problems with: NO    Do you use: NO    Have you been hospitalized in the last Month? no    Have you had chest pain (angina) or breathing problems  (COPD) in the last 3 months? no    Do you have any difficulty walking up a flight of stairs? no    Have you had Kidney failure or insufficiency? no    Have you had heart valve surgery? no    Are you confined to a wheelchair?  no

## 2021-04-20 ENCOUNTER — TELEPHONE (OUTPATIENT)
Dept: GASTROENTEROLOGY | Facility: CLINIC | Age: 51
End: 2021-04-20

## 2021-04-20 DIAGNOSIS — Z12.11 SPECIAL SCREENING FOR MALIGNANT NEOPLASMS, COLON: Primary | ICD-10-CM

## 2021-04-20 RX ORDER — SODIUM, POTASSIUM,MAG SULFATES 17.5-3.13G
SOLUTION, RECONSTITUTED, ORAL ORAL
Qty: 2 BOTTLE | Refills: 0 | Status: SHIPPED | OUTPATIENT
Start: 2021-04-20

## 2021-04-20 NOTE — TELEPHONE ENCOUNTER
Passed oa  Colonoscopy scheduled 06/01/21 @[facility] with Dr Edgard rodriguez instructions given verbally/mailed to pt     03/10/21  Screened by: Chrisandra Hodgkin     Referring Provider Dr Chanel Trinh: If patient answers NO to medical questions, then schedule procedure  If patient answers YES to medical questions, then schedule office appointment      Previous Colonoscopy no  Date and Facility of last colonoscopy?      Comments: patient passed OA and would like to be scheduled with Dr Edgard Chase or anyone sooner  Patient can be reached at [phone]           Pre- Screening: There is no height or weight on file to calculate BMI  Has patient been referred for a routine screening Colonoscopy? yes  Is the patient between 39-70 years old? yes     SCHEDULING STAFF  · If the patient is between 45yrs-49yrs, please advise patient to confirm benefits/coverage with their insurance company for a routine screening colonoscopy, some insurance carriers will only cover at [phone] or older  · If the patient is over 76years old, please schedule an office visit  · If the patient had a previous colonoscopy send to the procedure  before continuing     Have you been diagnosed with a bleeding disorder or anemia? no     Do you take NO     Have you been diagnosed with Diabetes or are you taking any   Diabetic medications? no     Do you have any of the following symptoms? NO     Have you had a coronary or vascular stent within the last year? no     Have you had a heart attack or stroke in the last 6 months? no     Have you had intestinal surgery in the last 3 months? no     Do you have problems with: NO     Do you use: NO     Have you been hospitalized in the last Month? no     Have you had chest pain (angina) or breathing problems  (COPD) in the last 3 months? no     Do you have any difficulty walking up a flight of stairs? no     Have you had Kidney failure or insufficiency? no     Have you had heart valve surgery? no     Are you confined to a wheelchair?  no

## 2021-05-24 ENCOUNTER — HOSPITAL ENCOUNTER (OUTPATIENT)
Dept: MAMMOGRAPHY | Facility: MEDICAL CENTER | Age: 51
Discharge: HOME | End: 2021-05-24
Payer: COMMERCIAL

## 2021-05-24 VITALS — HEIGHT: 64 IN | BODY MASS INDEX: 28 KG/M2 | WEIGHT: 164 LBS

## 2021-05-24 DIAGNOSIS — Z12.31 ENCOUNTER FOR SCREENING MAMMOGRAM FOR MALIGNANT NEOPLASM OF BREAST: ICD-10-CM

## 2021-05-24 PROCEDURE — 77067 SCR MAMMO BI INCL CAD: CPT

## 2021-05-24 PROCEDURE — 77063 BREAST TOMOSYNTHESIS BI: CPT

## 2021-05-28 ENCOUNTER — TELEPHONE (OUTPATIENT)
Dept: GASTROENTEROLOGY | Facility: AMBULARY SURGERY CENTER | Age: 51
End: 2021-05-28

## 2021-06-01 ENCOUNTER — ANESTHESIA (OUTPATIENT)
Dept: GASTROENTEROLOGY | Facility: AMBULARY SURGERY CENTER | Age: 51
End: 2021-06-01

## 2021-06-01 ENCOUNTER — ANESTHESIA EVENT (OUTPATIENT)
Dept: ANESTHESIOLOGY | Facility: HOSPITAL | Age: 51
End: 2021-06-01

## 2021-06-01 ENCOUNTER — ANESTHESIA EVENT (OUTPATIENT)
Dept: GASTROENTEROLOGY | Facility: AMBULARY SURGERY CENTER | Age: 51
End: 2021-06-01

## 2021-06-01 ENCOUNTER — ANESTHESIA (OUTPATIENT)
Dept: ANESTHESIOLOGY | Facility: HOSPITAL | Age: 51
End: 2021-06-01

## 2021-06-01 ENCOUNTER — TELEPHONE (OUTPATIENT)
Dept: GASTROENTEROLOGY | Facility: AMBULARY SURGERY CENTER | Age: 51
End: 2021-06-01

## 2021-06-01 ENCOUNTER — HOSPITAL ENCOUNTER (OUTPATIENT)
Dept: GASTROENTEROLOGY | Facility: AMBULARY SURGERY CENTER | Age: 51
Setting detail: OUTPATIENT SURGERY
Discharge: HOME | End: 2021-06-01
Attending: INTERNAL MEDICINE | Admitting: INTERNAL MEDICINE
Payer: COMMERCIAL

## 2021-06-01 VITALS
WEIGHT: 160 LBS | DIASTOLIC BLOOD PRESSURE: 73 MMHG | HEART RATE: 61 BPM | TEMPERATURE: 96.9 F | SYSTOLIC BLOOD PRESSURE: 108 MMHG | BODY MASS INDEX: 27.31 KG/M2 | RESPIRATION RATE: 19 BRPM | OXYGEN SATURATION: 100 % | HEIGHT: 64 IN

## 2021-06-01 DIAGNOSIS — Z12.11 SPECIAL SCREENING FOR MALIGNANT NEOPLASMS, COLON: ICD-10-CM

## 2021-06-01 PROCEDURE — G0121 COLON CA SCRN NOT HI RSK IND: HCPCS | Performed by: INTERNAL MEDICINE

## 2021-06-01 RX ORDER — PROPOFOL 10 MG/ML
INJECTION, EMULSION INTRAVENOUS AS NEEDED
Status: DISCONTINUED | OUTPATIENT
Start: 2021-06-01 | End: 2021-06-01

## 2021-06-01 RX ORDER — SODIUM CHLORIDE, SODIUM LACTATE, POTASSIUM CHLORIDE, CALCIUM CHLORIDE 600; 310; 30; 20 MG/100ML; MG/100ML; MG/100ML; MG/100ML
50 INJECTION, SOLUTION INTRAVENOUS CONTINUOUS
Status: CANCELLED | OUTPATIENT
Start: 2021-06-01

## 2021-06-01 RX ORDER — LIDOCAINE HYDROCHLORIDE 10 MG/ML
INJECTION, SOLUTION EPIDURAL; INFILTRATION; INTRACAUDAL; PERINEURAL AS NEEDED
Status: DISCONTINUED | OUTPATIENT
Start: 2021-06-01 | End: 2021-06-01

## 2021-06-01 RX ORDER — SODIUM CHLORIDE, SODIUM LACTATE, POTASSIUM CHLORIDE, CALCIUM CHLORIDE 600; 310; 30; 20 MG/100ML; MG/100ML; MG/100ML; MG/100ML
INJECTION, SOLUTION INTRAVENOUS CONTINUOUS PRN
Status: DISCONTINUED | OUTPATIENT
Start: 2021-06-01 | End: 2021-06-01

## 2021-06-01 RX ADMIN — PROPOFOL 50 MG: 10 INJECTION, EMULSION INTRAVENOUS at 11:35

## 2021-06-01 RX ADMIN — PROPOFOL 50 MG: 10 INJECTION, EMULSION INTRAVENOUS at 11:31

## 2021-06-01 RX ADMIN — PROPOFOL 100 MG: 10 INJECTION, EMULSION INTRAVENOUS at 11:22

## 2021-06-01 RX ADMIN — SODIUM CHLORIDE, SODIUM LACTATE, POTASSIUM CHLORIDE, AND CALCIUM CHLORIDE: .6; .31; .03; .02 INJECTION, SOLUTION INTRAVENOUS at 10:51

## 2021-06-01 RX ADMIN — LIDOCAINE HYDROCHLORIDE 50 MG: 10 INJECTION, SOLUTION EPIDURAL; INFILTRATION; INTRACAUDAL at 11:22

## 2021-06-01 RX ADMIN — PROPOFOL 20 MG: 10 INJECTION, EMULSION INTRAVENOUS at 10:57

## 2021-06-01 RX ADMIN — PROPOFOL 30 MG: 10 INJECTION, EMULSION INTRAVENOUS at 11:27

## 2021-06-01 RX ADMIN — PROPOFOL 50 MG: 10 INJECTION, EMULSION INTRAVENOUS at 11:24

## 2021-06-01 RX ADMIN — PROPOFOL 50 MG: 10 INJECTION, EMULSION INTRAVENOUS at 11:38

## 2021-06-01 RX ADMIN — PROPOFOL 50 MG: 10 INJECTION, EMULSION INTRAVENOUS at 11:29

## 2021-06-01 NOTE — H&P
History and Physical -  [facility]  Linda Montelongoak 49 y o  female MRN: [id number]          HPI: Niyah Hunt is a 49y o  year old female who presents for colonoscopy for colon cancer screening  REVIEW OF SYSTEMS: Per the HPI, and otherwise unremarkable      Historical Information   Past Medical History:   Diagnosis Date    Abnormal Pap smear of cervix     Anxiety     BRCA1 negative     BRCA2 negative     Depression      Past Surgical History:   Procedure Laterality Date    COLPOSCOPY      HYSTERECTOMY  2012    OOPHORECTOMY  2012    one ovary remaining     Social History   Social History     Substance and Sexual Activity   Alcohol Use Yes    Comment: rarely      Social History     Substance and Sexual Activity   Drug Use No     Social History     Tobacco Use   Smoking Status Never Smoker   Smokeless Tobacco Never Used     Family History   Problem Relation Age of Onset    Ovarian cancer Maternal Grandmother 48    Aortic aneurysm Maternal Grandmother     Heart attack Father     Coronary artery disease Father    Georgeana Rouleau Anuerysm Father     Hyperlipidemia Father     Prostate cancer Father 79    Coronary artery disease Maternal Grandfather     Heart attack Maternal Grandfather     Coronary artery disease Paternal Grandfather     Heart attack Paternal Grandfather     Anuerysm Paternal Grandfather     No Known Problems Mother     No Known Problems Sister     No Known Problems Paternal Grandmother     No Known Problems Sister     No Known Problems Maternal Aunt     No Known Problems Paternal Aunt     Arrhythmia Neg Hx     Anemia Neg Hx     Asthma Neg Hx     Clotting disorder Neg Hx     Fainting Neg Hx     Heart disease Neg Hx     Heart failure Neg Hx        Meds/Allergies       Current Outpatient Medications:     amitriptyline (ELAVIL) 10 mg tablet    Ascorbic Acid (VITAMIN C) 100 MG CHEW    Calcium 500-100 MG-UNIT CHEW    Cholecalciferol (VITAMIN D3) 5000 units CAPS    DULoxetine (CYMBALTA) 30 mg delayed release capsule    Magnesium 100 MG CAPS    Na Sulfate-K Sulfate-Mg Sulf (Suprep Bowel Prep Kit) 17 5-3 13-1 6 GM/177ML SOLN    valACYclovir (VALTREX) 500 mg tablet    Allergies   Allergen Reactions    Sulfamethoxazole-Trimethoprim Hives       Objective     /77   Pulse 74   Temp (!) 97 °F (36 1 °C) (Temporal)   Resp 15   Ht 5' 4" (1 626 m)   Wt 72 6 kg (160 lb)   SpO2 98%   BMI 27 46 kg/m²       PHYSICAL EXAM    Gen: NAD  Head: NCAT  CV: RRR  CHEST: Clear  ABD: soft, NT/ND  EXT: no edema      ASSESSMENT/PLAN:  This is a 49y o  year old female here for colonoscopy, and she is stable and optimized for her procedure

## 2021-06-01 NOTE — ANESTHESIA POSTPROCEDURE EVALUATION
Post-Op Assessment Note    CV Status:  Stable  Pain Score: 0    Pain management: adequate     Mental Status:  Awake and somnolent   Hydration Status:  Stable   PONV Controlled:  None   Airway Patency:  Patent      Post Op Vitals Reviewed: Yes      Staff: CRNA         No complications documented      BP      Temp     Pulse     Resp      SpO2

## 2021-06-01 NOTE — ANESTHESIA PREPROCEDURE EVALUATION
Procedure:  PRE-OP ONLY    Relevant Problems   CARDIO   (+) Hypercholesteremia      NEURO/PSYCH   (+) Major depressive disorder, recurrent episode, moderate (HCC)      EKG 5/2019:  Normal sinus rhythm and is within normal limits  Lab Results   Component Value Date    WBC 4 57 03/10/2021    HGB 12 8 03/10/2021    HCT 38 8 03/10/2021    MCV 96 03/10/2021     03/10/2021     Lab Results   Component Value Date    SODIUM 143 11/14/2019    K 4 4 11/14/2019     11/14/2019    CO2 30 11/14/2019    BUN 15 11/14/2019    CREATININE 0 84 11/14/2019    GLUC 84 09/02/2016    CALCIUM 8 8 11/14/2019     No results found for: INR, PROTIME  No results found for: HGBA1C              Anesthesia Plan  ASA Score- 2     Anesthesia Type- IV sedation with anesthesia with ASA Monitors  Additional Monitors:   Airway Plan:           Plan Factors-    Chart reviewed  EKG reviewed  Existing labs reviewed  Patient summary reviewed  Induction- intravenous      Postoperative Plan-     Informed Consent-

## 2021-06-01 NOTE — ANESTHESIA PREPROCEDURE EVALUATION
Procedure:  COLONOSCOPY    Relevant Problems   ANESTHESIA (within normal limits)      CARDIO   (+) Hypercholesteremia      NEURO/PSYCH   (+) Major depressive disorder, recurrent episode, moderate (HCC)      EKG 5/2019:  Normal sinus rhythm and is within normal limits  Lab Results   Component Value Date    WBC 4 57 03/10/2021    HGB 12 8 03/10/2021    HCT 38 8 03/10/2021    MCV 96 03/10/2021     03/10/2021     Lab Results   Component Value Date    SODIUM 143 11/14/2019    K 4 4 11/14/2019     11/14/2019    CO2 30 11/14/2019    BUN 15 11/14/2019    CREATININE 0 84 11/14/2019    GLUC 84 09/02/2016    CALCIUM 8 8 11/14/2019     No results found for: INR, PROTIME  No results found for: HGBA1C         Physical Exam    Airway    Mallampati score: I    Neck ROM: full     Dental   No notable dental hx     Cardiovascular  Cardiovascular exam normal    Pulmonary  Pulmonary exam normal     Other Findings      Anesthesia Plan  ASA Score- 2     Anesthesia Type- IV sedation with anesthesia with ASA Monitors  Additional Monitors:   Airway Plan:           Plan Factors-Exercise tolerance (METS): >4 METS  Chart reviewed  EKG reviewed  Existing labs reviewed  Patient summary reviewed  Induction- intravenous  Postoperative Plan-     Informed Consent- Anesthetic plan and risks discussed with patient  I personally reviewed this patient with the CRNA  Discussed and agreed on the Anesthesia Plan with the CRNA  Cordell Paredes

## 2021-09-07 ENCOUNTER — OFFICE VISIT (OUTPATIENT)
Dept: OBGYN CLINIC | Facility: CLINIC | Age: 51
End: 2021-09-07
Payer: COMMERCIAL

## 2021-09-07 VITALS
BODY MASS INDEX: 27.31 KG/M2 | SYSTOLIC BLOOD PRESSURE: 97 MMHG | HEIGHT: 64 IN | HEART RATE: 81 BPM | DIASTOLIC BLOOD PRESSURE: 66 MMHG | WEIGHT: 160 LBS

## 2021-09-07 DIAGNOSIS — M77.11 LATERAL EPICONDYLITIS OF RIGHT ELBOW: Primary | ICD-10-CM

## 2021-09-07 PROCEDURE — 20551 NJX 1 TENDON ORIGIN/INSJ: CPT | Performed by: ORTHOPAEDIC SURGERY

## 2021-09-07 PROCEDURE — 99203 OFFICE O/P NEW LOW 30 MIN: CPT | Performed by: ORTHOPAEDIC SURGERY

## 2021-09-07 RX ORDER — LIDOCAINE HYDROCHLORIDE 10 MG/ML
1 INJECTION, SOLUTION INFILTRATION; PERINEURAL
Status: COMPLETED | OUTPATIENT
Start: 2021-09-07 | End: 2021-09-07

## 2021-09-07 RX ORDER — DEXAMETHASONE SODIUM PHOSPHATE 100 MG/10ML
40 INJECTION INTRAMUSCULAR; INTRAVENOUS
Status: COMPLETED | OUTPATIENT
Start: 2021-09-07 | End: 2021-09-07

## 2021-09-07 RX ADMIN — DEXAMETHASONE SODIUM PHOSPHATE 40 MG: 100 INJECTION INTRAMUSCULAR; INTRAVENOUS at 10:53

## 2021-09-07 RX ADMIN — LIDOCAINE HYDROCHLORIDE 1 ML: 10 INJECTION, SOLUTION INFILTRATION; PERINEURAL at 10:53

## 2021-09-07 NOTE — PROGRESS NOTES
Assessment/Plan:  1  Lateral epicondylitis of right elbow         49year old female with right lateral epicondylitis  I discussed the anatomy and physiology of lateral epicondylitis with the patient today  I explained that one of the mainstays of treatment would be therapy and highly suggest she proceed with this  I did explain that this can be transitioned to a home exercise program as she learns the exercises  I also discussed braces with the patient today  I explained that since she is tender along the proximal wrist extensors, that a counterforce brace may cause more pain  Therefore, we will proceed with wrist brace only for her  In addition, I discussed the use of a steroid injection for this condition  I did explain that this may be repeated a few times if necessary, but is not an injection that continues to be used for years  Patient states understanding and agrees to proceed with steroid injection today  We will have her follow up in 3 months for reevaluation  Subjective:   Harish Hopper is a 49 y o  female who presents to the office today for evaluation and treatment of right elbow pain  Patient states this has been present for approximately 1 month  No known trauma  She states the pain is along the lateral aspect of the elbow  Seems to be worse with weighted activity, such as picking up her coffee or water bottle  Patient states that while the pain doesn't seem to bother her while working, she will notice pain in the elbow after finishing her work day  She denies numbness/tingling, but describes a burning sensation again along the lateral elbow  She describes occasional shoulder pain on this side as well, has history of previous frozen shoulder status post [facility] (Dr Claudine Byrd) and PT approximately 5 years ago  Patient is a dental hygienist       Review of Systems   Constitutional: Negative  HENT: Negative  Eyes: Negative  Respiratory: Negative  Cardiovascular: Negative  Gastrointestinal: Negative  Endocrine: Negative  Genitourinary: Negative  Musculoskeletal:        As noted in HPI   Skin: Negative  Neurological: Negative  Hematological: Negative  Psychiatric/Behavioral: Negative            Past Medical History:   Diagnosis Date    Abnormal Pap smear of cervix     Anxiety     BRCA1 negative     BRCA2 negative     Depression        Past Surgical History:   Procedure Laterality Date    COLPOSCOPY      HYSTERECTOMY  2012    OOPHORECTOMY  2012    one ovary remaining       Family History   Problem Relation Age of Onset    Ovarian cancer Maternal Grandmother 48    Aortic aneurysm Maternal Grandmother     Heart attack Father     Coronary artery disease Father    Bozena Power Anuerysm Father     Hyperlipidemia Father     Prostate cancer Father 79    Coronary artery disease Maternal Grandfather     Heart attack Maternal Grandfather     Coronary artery disease Paternal Grandfather     Heart attack Paternal Grandfather     Anuerysm Paternal Grandfather     No Known Problems Mother     No Known Problems Sister     No Known Problems Paternal Grandmother     No Known Problems Sister     No Known Problems Maternal Aunt     No Known Problems Paternal Aunt     Arrhythmia Neg Hx     Anemia Neg Hx     Asthma Neg Hx     Clotting disorder Neg Hx     Fainting Neg Hx     Heart disease Neg Hx     Heart failure Neg Hx        Social History     Occupational History    Not on file   Tobacco Use    Smoking status: Never Smoker    Smokeless tobacco: Never Used   Vaping Use    Vaping Use: Never used   Substance and Sexual Activity    Alcohol use: Yes     Comment: rarely     Drug use: No    Sexual activity: Yes     Partners: Male         Current Outpatient Medications:     amitriptyline (ELAVIL) 10 mg tablet, Take 10 mg by mouth daily at bedtime, Disp: , Rfl:     Ascorbic Acid (VITAMIN C) 100 MG CHEW, Vitamin C, Disp: , Rfl:     Calcium 500-100 MG-UNIT CHEW, Calcium 500, Disp: , Rfl:     Cholecalciferol (VITAMIN D3) 5000 units CAPS, Vitamin D3, Disp: , Rfl:     DULoxetine (CYMBALTA) 30 mg delayed release capsule, Take 30 mg by mouth daily , Disp: , Rfl:     Magnesium 100 MG CAPS, magnesium, Disp: , Rfl:     Na Sulfate-K Sulfate-Mg Sulf (Suprep Bowel Prep Kit) 17 5-3 13-1 6 GM/177ML SOLN, Take as directed by office (Patient not taking: Reported on 9/7/2021), Disp: 2 Bottle, Rfl: 0    valACYclovir (VALTREX) 500 mg tablet, Take 1 tablet (500 mg total) by mouth daily for 1 dose, Disp: 90 tablet, Rfl: 3    Allergies   Allergen Reactions    Sulfamethoxazole-Trimethoprim Hives       Objective:  Vitals:    09/07/21 1007   BP: 97/66   Pulse: 81       Ortho Exam   Right Elbow:  No edema, erythema, ecchymosis  Patient denies tenderness to palpation of medial epicondyle, ulnar nerve, biceps, triceps and radiocapitellar joint  She does have tenderness along the area of the lateral epicondylar ridge and proximal wrist extensor muscle mass  Patient has pain with active wrist extension as well as long finger extension  Sensation intact to light touch and motor intact to m/r/u nerves  Radial pulse 2+  Physical Exam  Vitals reviewed  Constitutional:       Appearance: Normal appearance  HENT:      Head: Normocephalic and atraumatic  Eyes:      Conjunctiva/sclera: Conjunctivae normal    Cardiovascular:      Rate and Rhythm: Normal rate  Pulmonary:      Effort: Pulmonary effort is normal  No respiratory distress  Abdominal:      Tenderness: There is no guarding  Musculoskeletal:      Cervical back: No rigidity  Skin:     General: Skin is warm and dry  Neurological:      Mental Status: She is alert and oriented to person, place, and time  Psychiatric:         Behavior: Behavior normal        Patient wished to forego xrays today      Hand/upper extremity injection: R elbow  Universal Protocol:  Risks and benefits: risks, benefits and alternatives were discussed  Consent given by: patient  Patient understanding: patient states understanding of the procedure being performed  Patient identity confirmed: verbally with patient    Supporting Documentation  Indications: pain and therapeutic   Procedure Details  Condition:lateral epicondylitis Site: R elbow   Needle size: 25 G  Approach: lateral  Medications administered: 1 mL lidocaine 1 %; 40 mg dexamethasone 100 mg/10 mL    Patient tolerance: patient tolerated the procedure well with no immediate complications  Dressing:  Sterile dressing applied

## 2021-09-14 ENCOUNTER — EVALUATION (OUTPATIENT)
Dept: PHYSICAL THERAPY | Facility: REHABILITATION | Age: 51
End: 2021-09-14
Payer: COMMERCIAL

## 2021-09-14 DIAGNOSIS — M77.11 LATERAL EPICONDYLITIS OF RIGHT ELBOW: ICD-10-CM

## 2021-09-14 PROCEDURE — 97161 PT EVAL LOW COMPLEX 20 MIN: CPT

## 2021-09-14 NOTE — PROGRESS NOTES
PT Evaluation     Today's date: 2021  Patient name: Shari Brown  : 1970  MRN: [id number]  Referring provider: Antoinette Santos PA-C  Dx:   Encounter Diagnosis     ICD-10-CM    1  Lateral epicondylitis of right elbow  M77 11 Ambulatory referral to Physical Therapy       Start Time: 1145  Stop Time: 1230  Total time in clinic (min): 45 minutes    Assessment  Assessment details: Shari Brown is a pleasant 49 y o  female who presents with lateral elbow pain that has been going on for at least 1 month  Painful activities include, gripping, squeezing, and lifting activities  Would benefit from skilled physical therapy to improve extensor tendon and musculature strength to improve elbow, wrist and hand function  The patient's greatest concerns are the pain she is experiencing and fear of not being able to keep active  The primary movement problem is decreased extensor tendon/musculature strength resulting in symptoms consistent with lateral epicondylitis and limiting her ability to carry and lift  No further referral appears necessary at this time based upon examination results  Primary Impairments:  1) Decreased extensor tendon/musculature strength  2) Pain with gripping    Etiologic factors include none recalled by the patient  Impairments: activity intolerance, impaired physical strength, lacks appropriate home exercise program and pain with function    Symptom irritability: moderateUnderstanding of Dx/Px/POC: good   Prognosis: good  Prognosis details: Positive prognostic indicators include positive attitude toward recovery and good understanding of diagnosis and treatment plan options  Negative prognostic indicators include chronicity of symptoms, high symptom irritability, multiple concurrent orthopedic problems and obesity  Goals  ST-4 weeks  Patient will be independent with home exercise program    Patient will be able to manage symptoms independently    Patient will decrease pain by 25-50%    LTG: by discharge  Patient will improve FOTO to goal  Patient will be able to lift yeti without pain  Patient will report improvements in wrist, hand and elbow function    Plan  Plan details: Prognosis above is given PT services 2x/week tapering to 1x/week over the next 2 months and home program adherence  Patient would benefit from: skilled physical therapy  Planned modality interventions: thermotherapy: hydrocollator packs  Planned therapy interventions: joint mobilization, manual therapy, motor coordination training, neuromuscular re-education, patient education, self care, therapeutic activities, therapeutic exercise, graded activity, home exercise program, behavior modification, massage, IADL retraining, ADL retraining, abdominal trunk stabilization, activity modification, balance, balance/weight bearing training, strengthening, stretching, functional ROM exercises, gait training, fine motor coordination training, flexibility, graded exercise, graded motor and therapeutic training  Frequency: 2x week  Duration in visits: 10  Duration in weeks: 10  Treatment plan discussed with: patient        Subjective Evaluation    History of Present Illness  Mechanism of injury: Linda is a 49 y o  female presenting to physical therapy on 21 with referral from MD for lateral elbow pain that began at least a month ago  Gripping activities, lifting objects are most bothersome  Reports some neck/shoulder issues, but receives massages every month and reports that helps a lot  Denies numbness/tingling in her arm  Reports massage helps relieve her elbow symptoms  Had an injection which helped, but did not remove all the pain           Pain  Current pain ratin  At best pain ratin  At worst pain ratin  Quality: dull ache  Relieving factors: rest and ice      Diagnostic Tests  No diagnostic tests performed  Treatments  Previous treatment: injection treatment        Objective  Posture: rounded shoulders, increased thoracic kyphosis  Myotomes all intact b/l  Dermatome: all intact b/l        Reflexes: 2+ b/l                Cervical  % of normal   Flex  100%   Extn  100%   SB Left 100%   SB Right 100%   ROT Left 100%   ROT Right 100%         MMT         AROM          PROM    Shoulder       L       R        L           R      L     R   Flex  Abd  IR          ER                    Rhomboid         Mid Trap 4- 4-       Low Trap 3+ 3+       Serratus         Infraspnatus         Teres Major         Sub Scap           Radial nerve tension testing (-)       Precautions: hypercholesterolemia, depression    Manuals 09/14                                                                Neuro Re-Ed 09/14                                                                                                       Ther Ex 09/14            Eccentric wrist extensions 3lb 1x8 HEP            Isometrics  1x45" HEP            Pronation/supination with weight 2x10 HEP 5lb                                                                HEP/education 10'            Ther Activity                                       Gait Training                                       Modalities

## 2021-09-21 ENCOUNTER — APPOINTMENT (OUTPATIENT)
Dept: PHYSICAL THERAPY | Facility: REHABILITATION | Age: 51
End: 2021-09-21
Payer: COMMERCIAL

## 2021-09-28 ENCOUNTER — OFFICE VISIT (OUTPATIENT)
Dept: PHYSICAL THERAPY | Facility: REHABILITATION | Age: 51
End: 2021-09-28
Payer: COMMERCIAL

## 2021-09-28 DIAGNOSIS — M77.11 LATERAL EPICONDYLITIS OF RIGHT ELBOW: Primary | ICD-10-CM

## 2021-09-28 PROCEDURE — 97140 MANUAL THERAPY 1/> REGIONS: CPT

## 2021-09-28 PROCEDURE — 97110 THERAPEUTIC EXERCISES: CPT

## 2021-09-28 NOTE — PROGRESS NOTES
Daily Note     Today's date: 2021  Patient name: Harish Hopper  : 1970  MRN: [id number]  Referring provider: Leanne Ruiz PA-C  Dx:   Encounter Diagnosis     ICD-10-CM    1  Lateral epicondylitis of right elbow  M77 11        Start Time: 957  Stop Time:   Total time in clinic (min): 33 minutes    Subjective: Reports her elbow is feeling better  States she can  her yeti at times without discomfort  Objective: See treatment diary below    Assessment: Tolerated treatment well  Educated on HEP progression and removal of isometrics from HEP due to improvement  Educated on proper posture and ways to correct posture at work and working with supinated rather than pronated hand  Soreness reported in extensors following exercise protocol  Explained to pt to take tomorrow off from exercises to allow them to rest  Patient demonstrated fatigue post treatment, exhibited good technique with therapeutic exercises and would benefit from continued PT    Plan: Continue per plan of care        Precautions: hypercholesterolemia, depression    Manuals            MWM lateral glide  2x5 QD           STM extensors  QD 8'                                     Neuro Re-Ed                                                                                                       Ther Ex            Eccentric wrist extensions 3lb 1x8 HEP therabar flexbar 2x8           Isometrics  1x45" HEP            Pronation/supination with weight 2x10 HEP 5lb hammer 3x10           Rubber band finger extensions  3x8                                                  HEP/education 10' 10'           Ther Activity                                       Gait Training                                       Modalities

## 2021-10-05 ENCOUNTER — OFFICE VISIT (OUTPATIENT)
Dept: PHYSICAL THERAPY | Facility: REHABILITATION | Age: 51
End: 2021-10-05
Payer: COMMERCIAL

## 2021-10-05 DIAGNOSIS — M77.11 LATERAL EPICONDYLITIS OF RIGHT ELBOW: Primary | ICD-10-CM

## 2021-10-05 PROCEDURE — 97140 MANUAL THERAPY 1/> REGIONS: CPT

## 2021-10-05 PROCEDURE — 97110 THERAPEUTIC EXERCISES: CPT

## 2021-10-12 ENCOUNTER — APPOINTMENT (OUTPATIENT)
Dept: PHYSICAL THERAPY | Facility: REHABILITATION | Age: 51
End: 2021-10-12
Payer: COMMERCIAL

## 2022-03-22 ENCOUNTER — ANNUAL EXAM (OUTPATIENT)
Dept: OBGYN CLINIC | Facility: CLINIC | Age: 52
End: 2022-03-22
Payer: COMMERCIAL

## 2022-03-22 VITALS
BODY MASS INDEX: 28 KG/M2 | WEIGHT: 164 LBS | SYSTOLIC BLOOD PRESSURE: 136 MMHG | HEIGHT: 64 IN | DIASTOLIC BLOOD PRESSURE: 82 MMHG

## 2022-03-22 DIAGNOSIS — Z01.419 ENCOUNTER FOR GYNECOLOGICAL EXAMINATION WITHOUT ABNORMAL FINDING: Primary | ICD-10-CM

## 2022-03-22 DIAGNOSIS — Z12.31 ENCOUNTER FOR SCREENING MAMMOGRAM FOR MALIGNANT NEOPLASM OF BREAST: ICD-10-CM

## 2022-03-22 PROCEDURE — 99396 PREV VISIT EST AGE 40-64: CPT | Performed by: OBSTETRICS & GYNECOLOGY

## 2022-03-22 RX ORDER — DULOXETIN HYDROCHLORIDE 20 MG/1
20 CAPSULE, DELAYED RELEASE ORAL 2 TIMES DAILY
COMMUNITY
Start: 2021-12-31

## 2022-03-22 RX ORDER — VALACYCLOVIR HYDROCHLORIDE 500 MG/1
500 TABLET, FILM COATED ORAL DAILY
Qty: 90 TABLET | Refills: 3 | Status: SHIPPED | OUTPATIENT
Start: 2022-03-22 | End: 2022-03-23

## 2022-03-22 RX ORDER — HYDROXYZINE HYDROCHLORIDE 25 MG/1
25 TABLET, FILM COATED ORAL 3 TIMES DAILY
COMMUNITY
Start: 2021-12-30

## 2022-03-22 NOTE — PROGRESS NOTES
Assessment/Plan:    No problem-specific Assessment & Plan notes found for this encounter  Diagnoses and all orders for this visit:    Encounter for gynecological examination without abnormal finding  -     valACYclovir (VALTREX) 500 mg tablet; Take 1 tablet (500 mg total) by mouth daily for 1 dose    Encounter for screening mammogram for malignant neoplasm of breast  -     Mammo screening bilateral w 3d & cad; Future    Other orders  -     DULoxetine (CYMBALTA) 20 mg capsule; Take 20 mg by mouth 2 (two) times a day  -     hydrOXYzine HCL (ATARAX) 25 mg tablet; Take 25 mg by mouth 3 (three) times a day (Patient not taking: Reported on 3/22/2022 )        Normal gynecological physical examination  Self-breast examination stressed  Mammogram ordered  Discussed regular exercise, healthy diet, importance of vitamin D and calcium supplements  Discussed importance of sun block use during periods of prolonged sun exposure  Patient will be seen in 1 year for routine gynecologic and medical examination  Patient will call office for any problems, concerns, or issues which may arise during the interim  Subjective:      HPI  47year old female patient with a PMH significant for depression, anxiety, and JAMIE with removal of one ovary presents outpatient for her annual gynecologic examination  Patient has been post menopausal for some time and has been practicing homeopathic medicine which has changed her life and she shares she has felt better now than she ever has  She denies vaginal bleeding, spotting hot flashes, mood swings, trouble sleeping, headaches  Denies any abnormal vaginal discharge, odors, itching, dryness, or burning  Patient does report doing SBE at home monthly  Denies any abnormal changes to breast or nipples  Denies any changes to breast scan, areas of tenderness, masses, or nipple discharge    Last mammogram in May of 2021 was unremarkable showing scattered areas of fibroglandular breast tissue, categorized BI-RADS 1  Patient does report a family history of ovarian cancer in her maternal grandmother  Maternal grandmother got diagnosed in her 46s  Patient shares her mother is a worrier and constantly get checked  Patient shares that she is not super concerned at this time, and she feels good  She has gotten for bloodwork checkups which have displayed negative BRCA1 and BRCA2, as well as other cancer markers  She denies any fatigue, fever, chills, changes in weight/energy/appetite, issues with bowels or bladder, blood in the urine, blood in stool, or lower pelvic pain or fullness  Denies lower leg edema  She offers no cardiopulmonary complaints such as shortness of breath, heart palpitations, chest pain/pressure, lightheadedness, dizziness, or recent falls at home  Patient currently takes vitamin-D and calcium tablets  Also reports rubbing progesterone cream on her skin  Denies smoking cigarettes or using nicotine  Drinks alcohol occasionally  She is currently sexually active with a monogamous partner  Denies pain with intercourse  She likes to walk 1-3 miles daily during the week  Patient is sure she is currently trying to wean off the Cymbalta she is on  She was at 40 mg, then weaned down to 30 mg, and is now 20 mg,    Patient ID: Aleena Toro is a 47 y o  female who presents today for her annual gynecologic and medical examination    Menstrual status:  Postmenopausal    Vasomotor symptoms:     Patient reports normal appetite    Patient reports normal bowel and bladder habits    Patient denies any significant pelvic or abdominal pain    Patient denies any headaches, chest pain, shortness of breath fever shakes or chills    Patient denies any COVID 19 symptoms including cough or loss of taste or smell    COVID vaccine status:  Fully vaccinated and boosted    Medical problems:  Depression, anxiety, past medical history of hysterectomy      Colonoscopy status:  Up-to-date, recently received in 2021  Mammogram status:  Ordered and patient will schedule for this year  The following portions of the patient's history were reviewed and updated as appropriate: allergies, current medications, past family history, past medical history, past social history, past surgical history and problem list     Review of Systems   Constitutional: Negative  Negative for appetite change, diaphoresis, fatigue, fever and unexpected weight change  HENT: Negative  Eyes: Negative  Respiratory: Negative  Cardiovascular: Negative  Gastrointestinal: Negative  Negative for abdominal pain, blood in stool, constipation, diarrhea, nausea and vomiting  Endocrine: Negative  Negative for cold intolerance and heat intolerance  Genitourinary: Negative  Negative for dysuria, frequency, hematuria, urgency, vaginal bleeding, vaginal discharge and vaginal pain  PMH hysterectomy, patient reports 1 ovary left   Musculoskeletal: Negative  Skin: Negative  Allergic/Immunologic: Negative  Neurological: Negative  Hematological: Negative  Negative for adenopathy  Psychiatric/Behavioral: Negative  Followed for depression and anxiety         Objective:      /82 (BP Location: Left arm, Patient Position: Sitting, Cuff Size: Standard)   Ht 5' 4" (1 626 m)   Wt 74 4 kg (164 lb)   BMI 28 15 kg/m²          Physical Exam  Exam conducted with a chaperone present  Constitutional:       Appearance: Normal appearance  She is well-developed  HENT:      Head: Normocephalic  Eyes:      Pupils: Pupils are equal, round, and reactive to light  Cardiovascular:      Rate and Rhythm: Normal rate and regular rhythm  Pulses: Normal pulses  Radial pulses are 2+ on the right side and 2+ on the left side  Dorsalis pedis pulses are 2+ on the right side and 2+ on the left side  Heart sounds: Normal heart sounds     Pulmonary:      Effort: Pulmonary effort is normal       Breath sounds: Normal breath sounds  Chest:   Breasts: Breasts are symmetrical       Right: Normal  No swelling, bleeding, inverted nipple, mass, nipple discharge, skin change, tenderness, axillary adenopathy or supraclavicular adenopathy  Left: Normal  No swelling, bleeding, inverted nipple, mass, nipple discharge, skin change, tenderness, axillary adenopathy or supraclavicular adenopathy  Comments: Inframammary ridge noted  3-4, small erythematous, mildly macerated plaques under the left breast  Probable tinea in origin  Patient has cream to put on these as she reports getting the spots in her belly button  Area under breast tissue moist which encourages the growth  Abdominal:      General: Bowel sounds are normal       Palpations: Abdomen is soft  Genitourinary:     Exam position: Supine  Labia:         Right: No rash, tenderness, lesion or injury  Left: No rash, tenderness, lesion or injury  Vagina: Normal  No erythema, tenderness or bleeding  Uterus: Absent  Adnexa:         Right: No mass, tenderness or fullness  Left: No mass, tenderness or fullness  Rectum: Normal       Comments: Good pelvic stability noted  Musculoskeletal:         General: Normal range of motion  Cervical back: Normal range of motion and neck supple  Right lower leg: No edema  Left lower leg: No edema  Lymphadenopathy:      Cervical: No cervical adenopathy  Upper Body:      Right upper body: No supraclavicular, axillary or pectoral adenopathy  Left upper body: No supraclavicular, axillary or pectoral adenopathy  Skin:     General: Skin is warm and dry  Findings: No rash  Neurological:      Mental Status: She is alert and oriented to person, place, and time  Psychiatric:         Speech: Speech normal          Behavior: Behavior normal          Thought Content:  Thought content normal          Judgment: Judgment normal

## 2022-06-09 ENCOUNTER — HOSPITAL ENCOUNTER (OUTPATIENT)
Dept: MAMMOGRAPHY | Facility: HOSPITAL | Age: 52
Discharge: HOME | End: 2022-06-09
Payer: COMMERCIAL

## 2022-06-09 VITALS — WEIGHT: 164.02 LBS | HEIGHT: 64 IN | BODY MASS INDEX: 28 KG/M2

## 2022-06-09 DIAGNOSIS — Z12.31 ENCOUNTER FOR SCREENING MAMMOGRAM FOR MALIGNANT NEOPLASM OF BREAST: ICD-10-CM

## 2022-06-09 PROCEDURE — 77067 SCR MAMMO BI INCL CAD: CPT

## 2022-06-09 PROCEDURE — 77063 BREAST TOMOSYNTHESIS BI: CPT

## 2022-08-27 ENCOUNTER — APPOINTMENT (OUTPATIENT)
Dept: LAB | Facility: CLINIC | Age: 52
End: 2022-08-27
Payer: COMMERCIAL

## 2022-08-27 DIAGNOSIS — E55.9 VITAMIN D DEFICIENCY: ICD-10-CM

## 2022-08-27 DIAGNOSIS — F41.1 GENERALIZED ANXIETY DISORDER: ICD-10-CM

## 2022-08-27 DIAGNOSIS — E78.5 HYPERLIPIDEMIA, UNSPECIFIED HYPERLIPIDEMIA TYPE: ICD-10-CM

## 2022-08-27 LAB
25(OH)D3 SERPL-MCNC: 47 NG/ML (ref 30–100)
ALBUMIN SERPL BCP-MCNC: 3.5 G/DL (ref 3.5–5)
ALP SERPL-CCNC: 63 U/L (ref 46–116)
ALT SERPL W P-5'-P-CCNC: 29 U/L (ref 12–78)
ANION GAP SERPL CALCULATED.3IONS-SCNC: 2 MMOL/L (ref 4–13)
AST SERPL W P-5'-P-CCNC: 11 U/L (ref 5–45)
BILIRUB SERPL-MCNC: 0.29 MG/DL (ref 0.2–1)
BUN SERPL-MCNC: 17 MG/DL (ref 5–25)
CALCIUM SERPL-MCNC: 8.9 MG/DL (ref 8.3–10.1)
CHLORIDE SERPL-SCNC: 105 MMOL/L (ref 96–108)
CHOLEST SERPL-MCNC: 209 MG/DL
CO2 SERPL-SCNC: 30 MMOL/L (ref 21–32)
CREAT SERPL-MCNC: 0.88 MG/DL (ref 0.6–1.3)
GFR SERPL CREATININE-BSD FRML MDRD: 76 ML/MIN/1.73SQ M
GLUCOSE P FAST SERPL-MCNC: 107 MG/DL (ref 65–99)
HDLC SERPL-MCNC: 65 MG/DL
LDLC SERPL CALC-MCNC: 128 MG/DL (ref 0–100)
NONHDLC SERPL-MCNC: 144 MG/DL
POTASSIUM SERPL-SCNC: 4.4 MMOL/L (ref 3.5–5.3)
PROT SERPL-MCNC: 7 G/DL (ref 6.4–8.4)
SODIUM SERPL-SCNC: 137 MMOL/L (ref 135–147)
T4 FREE SERPL-MCNC: 0.78 NG/DL (ref 0.76–1.46)
TRIGL SERPL-MCNC: 80 MG/DL
TSH SERPL DL<=0.05 MIU/L-ACNC: 0.7 UIU/ML (ref 0.45–4.5)

## 2022-08-27 PROCEDURE — 84439 ASSAY OF FREE THYROXINE: CPT

## 2022-08-27 PROCEDURE — 80061 LIPID PANEL: CPT

## 2022-08-27 PROCEDURE — 84443 ASSAY THYROID STIM HORMONE: CPT

## 2022-08-27 PROCEDURE — 80053 COMPREHEN METABOLIC PANEL: CPT

## 2022-08-27 PROCEDURE — 36415 COLL VENOUS BLD VENIPUNCTURE: CPT

## 2022-08-27 PROCEDURE — 82306 VITAMIN D 25 HYDROXY: CPT

## 2022-08-30 ENCOUNTER — TELEPHONE (OUTPATIENT)
Dept: PSYCHIATRY | Facility: CLINIC | Age: 52
End: 2022-08-30

## 2023-06-05 ENCOUNTER — OFFICE VISIT (OUTPATIENT)
Dept: OBGYN CLINIC | Facility: CLINIC | Age: 53
End: 2023-06-05
Payer: COMMERCIAL

## 2023-06-05 VITALS
DIASTOLIC BLOOD PRESSURE: 80 MMHG | HEIGHT: 64 IN | BODY MASS INDEX: 29.43 KG/M2 | SYSTOLIC BLOOD PRESSURE: 122 MMHG | WEIGHT: 172.4 LBS

## 2023-06-05 DIAGNOSIS — N95.1 VASOMOTOR SYMPTOMS DUE TO MENOPAUSE: Primary | ICD-10-CM

## 2023-06-05 PROCEDURE — 99213 OFFICE O/P EST LOW 20 MIN: CPT | Performed by: OBSTETRICS & GYNECOLOGY

## 2023-06-05 NOTE — PROGRESS NOTES
Assessment/Plan:    No problem-specific Assessment & Plan notes found for this encounter  Vasomotor symptoms due to the menopause     There are no diagnoses linked to this encounter  Subjective:      Patient ID: Imelda Henao is a 47 y o  female  Lnida is a 71-year-old female cholesterol and depression presenting to the office for increased menopausal symptoms  She states that she got hysterectomy 11 years ago and she has 1 ovary left  She reports that she has been experiencing some perimenopausal symptoms for the past 2 years at a slow progression, but within the last 2 weeks everything is gotten significantly worse  She reports hot flashes, severe mood swings, irritability, constipation, insomnia, and generalized fatigue  She denies generalized abdominal pain, flank pain, urinary symptoms, vaginal dryness, intermittent bleeding, and vaginal discharge  She spoke to her family doctor and they are going to make sure her thyroid levels and sugars are adequate  She does not wish to start estrogen at this time as her maternal grandma had a history of ovarian cancer  She is going to try natural supplements and speak to her psychiatrist if low-dose Zoloft 25 mg daily can be added to her regiment  She currently takes Pristiq 50 mg 1 time daily for her depression  We will follow-up with her in 3 to 4 months to see how she is doing and to reevaluate if estrogen is the best neck step for her  She has no other concerns at this time and is overall doing well      Total time of today's visit was 20 minutes of which greater than 50% was spent face-to-face counseling the patient as well as coordination of care, review of chart lab values, physical evaluation as well as computer entry into the [facility] medical record system      The following portions of the patient's history were reviewed and updated as appropriate: allergies, current medications, past family history, past medical history, past social history, "past surgical history and problem list     Review of Systems   Constitutional: Positive for fatigue  Negative for appetite change, diaphoresis, fever and unexpected weight change  HENT: Negative  Eyes: Negative  Respiratory: Negative  Cardiovascular: Negative  Gastrointestinal: Positive for constipation  Negative for abdominal pain, blood in stool, diarrhea, nausea and vomiting  Endocrine: Negative  Negative for cold intolerance and heat intolerance  Genitourinary: Negative  Negative for dysuria, frequency, hematuria, urgency, vaginal bleeding, vaginal discharge and vaginal pain  Musculoskeletal: Negative  Skin: Negative  Allergic/Immunologic: Negative  Neurological: Negative  Hematological: Negative  Negative for adenopathy  Psychiatric/Behavioral: Positive for agitation (mood swings and irritability) and sleep disturbance  Objective:      /80   Ht 5' 4\" (1 626 m)   Wt 78 2 kg (172 lb 6 4 oz)   BMI 29 59 kg/m²          Physical Exam  Constitutional:       Appearance: Normal appearance  She is well-developed  HENT:      Head: Normocephalic  Eyes:      Pupils: Pupils are equal, round, and reactive to light  Cardiovascular:      Rate and Rhythm: Normal rate  Pulmonary:      Effort: Pulmonary effort is normal    Chest:   Breasts:     Breasts are symmetrical       Right: No inverted nipple, mass, nipple discharge, skin change or tenderness  Left: No inverted nipple, mass, nipple discharge, skin change or tenderness  Musculoskeletal:         General: Normal range of motion  Cervical back: Normal range of motion and neck supple  Lymphadenopathy:      Cervical: No cervical adenopathy  Upper Body:      Right upper body: No supraclavicular adenopathy  Left upper body: No supraclavicular adenopathy  Skin:     General: Skin is warm and dry  Findings: No rash  Neurological:      General: No focal deficit present        Mental Status: " She is alert and oriented to person, place, and time  Psychiatric:         Mood and Affect: Mood normal          Speech: Speech normal          Behavior: Behavior normal          Thought Content: Thought content normal          Judgment: Judgment normal       Comments: Followed by psychiatrist for depression

## 2023-06-05 NOTE — PATIENT INSTRUCTIONS
Topic: Menopausal symptoms    Patient had hysterectomy 11 years ago and has 1 ovary left  She states she has been having perimenopausal symptoms for the past 2 years but within the last 2 weeks symptoms have gotten worse  She is currently experiencing hot flashes, mood swings, irritability, constipation, insomnia, increased fatigue  She denies abdominal pain, vaginal bleeding, vaginal discharge, vaginal dryness  She is interested in trying natural supplements as well as low-dose Zoloft  She does not wish to start oral estrogen at this time  She has follow-up appointments with both her family doctor and psychiatrist   We will follow-up with her in 3 to 4 months to see how she is doing whether Zoloft low-dose is started or not  All patient's questions answered in office today  If patient has any further questions or concerns feel free to call office or schedule a repeat appointment

## 2023-07-19 ENCOUNTER — HOSPITAL ENCOUNTER (OUTPATIENT)
Dept: MAMMOGRAPHY | Facility: HOSPITAL | Age: 53
Discharge: HOME | End: 2023-07-19
Payer: COMMERCIAL

## 2023-07-19 VITALS — BODY MASS INDEX: 29.37 KG/M2 | WEIGHT: 172 LBS | HEIGHT: 64 IN

## 2023-07-19 DIAGNOSIS — Z12.31 ENCOUNTER FOR SCREENING MAMMOGRAM FOR MALIGNANT NEOPLASM OF BREAST: ICD-10-CM

## 2023-07-19 PROCEDURE — 77063 BREAST TOMOSYNTHESIS BI: CPT

## 2023-07-19 PROCEDURE — 77067 SCR MAMMO BI INCL CAD: CPT

## 2023-12-01 NOTE — PROGRESS NOTES
Patient presents for assessment of left heel  Patient responded well to cortisone injection given 3 weeks ago  She could not take the meloxicam due to GI upset  She relates no left heel pain at this time  No additional treatment is needed  Reappoint p mary beth n 
no

## 2024-04-22 ENCOUNTER — ANNUAL EXAM (OUTPATIENT)
Dept: OBGYN CLINIC | Facility: CLINIC | Age: 54
End: 2024-04-22
Payer: COMMERCIAL

## 2024-04-22 VITALS
HEIGHT: 64 IN | WEIGHT: 174.4 LBS | DIASTOLIC BLOOD PRESSURE: 60 MMHG | SYSTOLIC BLOOD PRESSURE: 104 MMHG | BODY MASS INDEX: 29.78 KG/M2

## 2024-04-22 DIAGNOSIS — Z01.419 ENCOUNTER FOR GYNECOLOGICAL EXAMINATION WITHOUT ABNORMAL FINDING: Primary | ICD-10-CM

## 2024-04-22 DIAGNOSIS — N95.2 ATROPHIC VAGINITIS: ICD-10-CM

## 2024-04-22 DIAGNOSIS — Z12.31 ENCOUNTER FOR SCREENING MAMMOGRAM FOR MALIGNANT NEOPLASM OF BREAST: ICD-10-CM

## 2024-04-22 DIAGNOSIS — N95.1 VASOMOTOR SYMPTOMS DUE TO MENOPAUSE: ICD-10-CM

## 2024-04-22 PROCEDURE — 99396 PREV VISIT EST AGE 40-64: CPT | Performed by: OBSTETRICS & GYNECOLOGY

## 2024-04-22 RX ORDER — ZOLPIDEM TARTRATE 5 MG/1
TABLET ORAL
COMMUNITY

## 2024-04-22 RX ORDER — CYCLOBENZAPRINE HCL 10 MG
TABLET ORAL
COMMUNITY

## 2024-04-22 RX ORDER — AMOXICILLIN 500 MG/1
CAPSULE ORAL
COMMUNITY
Start: 2024-03-12

## 2024-04-22 RX ORDER — ESTRADIOL 0.5 MG/1
0.5 TABLET ORAL DAILY
Qty: 30 TABLET | Refills: 5 | Status: SHIPPED | OUTPATIENT
Start: 2024-04-22

## 2024-04-22 RX ORDER — SERTRALINE HYDROCHLORIDE 100 MG/1
100 TABLET, FILM COATED ORAL DAILY
COMMUNITY
Start: 2024-02-01

## 2024-04-22 RX ORDER — ESCITALOPRAM OXALATE 20 MG/1
TABLET ORAL
COMMUNITY

## 2024-04-22 RX ORDER — AMOXICILLIN 500 MG
CAPSULE ORAL
COMMUNITY

## 2024-04-22 RX ORDER — EVENING PRIMROSE OIL 500 MG
CAPSULE ORAL
COMMUNITY

## 2024-04-22 RX ORDER — VIT C/B6/B5/MAGNESIUM/HERB 173 50-5-6-5MG
CAPSULE ORAL
COMMUNITY

## 2024-04-22 NOTE — PATIENT INSTRUCTIONS
Normal gynecological physical examination  Mammogram ordered  Continue monthly self breast exams  Continue regular exercise, healthy diet, vitamin D   Always use sunblock when outside for prolonged periods  We will see you in 3-4 months for follow up on the Estrace  Please call the office with any questions or concerns in the meantime

## 2024-04-22 NOTE — PROGRESS NOTES
Assessment/Plan:    No problem-specific Assessment & Plan notes found for this encounter.       Diagnoses and all orders for this visit:    Encounter for screening mammogram for malignant neoplasm of breast  -     Mammo screening bilateral w 3d & cad; Future    Encounter for gynecological examination without abnormal finding    Atrophic vaginitis  - Discussed that her physical examination was remarkable for some atrophy and dryness, and that the estrogen replacement will help with this as well. She denies subjective complaints at this time.     Vasomotor symptoms due to menopause  -     estradiol (ESTRACE) 0.5 MG tablet; Take 1 tablet (0.5 mg total) by mouth daily  - Patient is very symptomatic at this time and desires estrogen replacement therapy. Discussed that we can start with estradiol (Estrace) 0.5 mg tablets daily and that if she feels she needs more, we can increase her dose. Discussed that since she had a hysterectomy she can take estrogen alone. Discussed risks and benefits of estradiol. Patient agrees to the above plan and all questions were answered. We will follow up in 3-4 months to discuss the estradiol.     Other orders  -     amoxicillin (AMOXIL) 500 mg capsule; TAKE 2 CAPSULES BY MOUTH AFTER SURGERY AND THEN 1 CAPSULE 3 TIMES A DAY UNTIL FINISHED. (Patient not taking: Reported on 4/22/2024)  -     VITAMIN B COMPLEX-C PO  -     cyclobenzaprine (FLEXERIL) 10 mg tablet;  (Patient not taking: Reported on 4/22/2024)  -     escitalopram (LEXAPRO) 20 mg tablet;  (Patient not taking: Reported on 4/22/2024)  -     Evening Primrose Oil 500 MG CAPS  -     Omega-3 Fatty Acids (Fish Oil) 1200 MG CAPS  -     sertraline (ZOLOFT) 50 mg tablet; Take 50 mg by mouth daily  -     sertraline (ZOLOFT) 100 mg tablet; Take 100 mg by mouth daily (Patient not taking: Reported on 4/22/2024)  -     Turmeric 500 MG CAPS  -     zolpidem (AMBIEN) 5 mg tablet; Take 1 tablet as needed by oral route at bedtime. (Patient not taking:  Reported on 4/22/2024)          Normal gynecological physical examination.  Self-breast examination stressed.  Mammogram ordered.  Discussed regular exercise, healthy diet, importance of vitamin D and calcium supplements.  Discussed importance of sun block use during periods of prolonged sun exposure.  Patient will be seen in 1 year for routine gynecologic and medical examination.  Patient will call office for any problems, concerns, or issues which may arise during the interim.     Subjective:          HPI    Patient ID: Linda Galindo is a 54 y.o. female with PMHx significant for HLD, Vitamin D deficiency, hysterectomy and left sided oophorectomy in 2012 who presents today for her annual gynecologic and medical examination. She states that she is having hot flashes regularly and is ready to try something for this at today's visit. She has also been dealing with a breakup where her partner of 5 years was found to be engaging in high risk sexual behaviors unbeknownst to her about 1 year ago. She was tested for STDs by her GP at this time and all were found to be negative. She is seeing a therapist and psychiatrist and engaging in self-help activities and is doing much better.     Menstrual status: s/p hysterectomy and unilateral oophorectomy in 2012. Denies vaginal bleeding, itching, burning, or dryness.     Vasomotor symptoms: c/o severe hot flashes, would like to start on HRT for this.     Patient reports normal appetite    Patient reports normal bowel and bladder habits    Patient denies any significant pelvic or abdominal pain    Patient denies any headaches, chest pain, shortness of breath fever shakes or chills    Patient denies any COVID 19 symptoms including cough or loss of taste or smell    COVID vaccine status: UTD    Medical problems: Managed for HLD, Major depressive disorder, vitamin D deficiency    Colonoscopy status: UTD, last colonoscopy 06/01/2021, normal. Follow up recommended in 10 years.  "    Mammogram status: UTD, last mammo 07/19/2023 [facility] 1, negative. Repeat in 1 year. Self exams have been benign.     Pap history:     The following portions of the patient's history were reviewed and updated as appropriate: allergies, current medications, past family history, past medical history, past social history, past surgical history and problem list.    Review of Systems   Constitutional: Negative.  Negative for appetite change, diaphoresis, fatigue, fever and unexpected weight change.   HENT: Negative.     Eyes: Negative.    Respiratory: Negative.     Cardiovascular: Negative.    Gastrointestinal: Negative.  Negative for abdominal pain, blood in stool, constipation, diarrhea, nausea and vomiting.   Endocrine: Positive for heat intolerance (hot flashes). Negative for cold intolerance.   Genitourinary: Negative.  Negative for dysuria, frequency, hematuria, urgency, vaginal bleeding, vaginal discharge and vaginal pain.   Musculoskeletal: Negative.    Skin: Negative.    Allergic/Immunologic: Negative.    Neurological: Negative.    Hematological: Negative.  Negative for adenopathy.   Psychiatric/Behavioral: Negative.          Managed for depression         Objective:      /60   Ht 5' 4\" (1.626 m)   Wt 79.1 kg (174 lb 6.4 oz)   BMI 29.94 kg/m²          Physical Exam  Constitutional:       General: She is not in acute distress.     Appearance: Normal appearance. She is well-developed. She is not diaphoretic.   HENT:      Head: Normocephalic and atraumatic.   Eyes:      Pupils: Pupils are equal, round, and reactive to light.   Cardiovascular:      Rate and Rhythm: Normal rate and regular rhythm.      Heart sounds: Normal heart sounds. No murmur heard.     No friction rub. No gallop.   Pulmonary:      Effort: Pulmonary effort is normal.      Breath sounds: Normal breath sounds.   Chest:   Breasts:     Breasts are symmetrical.      Right: No inverted nipple, mass, nipple discharge, skin change or " tenderness.      Left: No inverted nipple, mass, nipple discharge, skin change or tenderness.   Abdominal:      General: Bowel sounds are normal.      Palpations: Abdomen is soft.   Genitourinary:     General: Normal vulva.      Exam position: Supine.      Labia:         Right: No rash or lesion.         Left: No rash or lesion.       Urethra: No urethral swelling or urethral lesion.      Vagina: Normal. No vaginal discharge, erythema, tenderness or bleeding.      Uterus: Absent.       Adnexa:         Right: No mass, tenderness or fullness.          Left: No mass, tenderness or fullness.        Rectum: Normal. Guaiac result negative.      Comments: S/p hysterectomy. +mild atrophic changes, good pelvic support.   Musculoskeletal:         General: Normal range of motion.      Cervical back: Normal range of motion and neck supple.   Lymphadenopathy:      Cervical: No cervical adenopathy.      Upper Body:      Right upper body: No supraclavicular adenopathy.      Left upper body: No supraclavicular adenopathy.   Skin:     General: Skin is warm and dry.      Findings: No rash.   Neurological:      General: No focal deficit present.      Mental Status: She is alert and oriented to person, place, and time.   Psychiatric:         Mood and Affect: Mood normal.         Speech: Speech normal.         Behavior: Behavior normal.         Thought Content: Thought content normal.         Judgment: Judgment normal.

## 2024-06-12 ENCOUNTER — TELEPHONE (OUTPATIENT)
Dept: PSYCHIATRY | Facility: CLINIC | Age: 54
End: 2024-06-12

## 2024-06-12 NOTE — TELEPHONE ENCOUNTER
"Contacted patient off of NON-REFERRAL wait list to verify needs of services in attempts to update list with patient preferences. spoke with patient whom stated they are still interested in services but then asked how much longer it would be before they are scheduled. Writer notified patient they are unsure where patient is on wait list but the facility is actively working on the list itself. Patient denied the offer of extra resources and wanted to wait for slpa stating \"its fine I guess there are other people whom need it more than I do\" writer notified patient in any case of a crisis or emergency please go to their nearest ER or utilize the [facility] [place] located at our [place] office.       TALK THERAPY   Virtual preferred  Female preferred    "

## 2024-07-17 ENCOUNTER — TELEPHONE (OUTPATIENT)
Age: 54
End: 2024-07-17

## 2024-08-08 ENCOUNTER — HOSPITAL ENCOUNTER (OUTPATIENT)
Dept: RADIOLOGY | Age: 54
Discharge: HOME | End: 2024-08-08
Payer: COMMERCIAL

## 2024-08-08 VITALS — WEIGHT: 170 LBS | BODY MASS INDEX: 29.02 KG/M2 | HEIGHT: 64 IN

## 2024-08-08 DIAGNOSIS — Z12.31 ENCOUNTER FOR SCREENING MAMMOGRAM FOR MALIGNANT NEOPLASM OF BREAST: ICD-10-CM

## 2024-08-08 PROCEDURE — 77063 BREAST TOMOSYNTHESIS BI: CPT

## 2024-08-08 PROCEDURE — 77067 SCR MAMMO BI INCL CAD: CPT

## 2024-08-22 ENCOUNTER — OFFICE VISIT (OUTPATIENT)
Dept: OBGYN CLINIC | Facility: CLINIC | Age: 54
End: 2024-08-22
Payer: COMMERCIAL

## 2024-08-22 VITALS
BODY MASS INDEX: 30.8 KG/M2 | SYSTOLIC BLOOD PRESSURE: 112 MMHG | DIASTOLIC BLOOD PRESSURE: 68 MMHG | WEIGHT: 180.4 LBS | HEIGHT: 64 IN

## 2024-08-22 DIAGNOSIS — N95.1 VASOMOTOR SYMPTOMS DUE TO MENOPAUSE: Primary | ICD-10-CM

## 2024-08-22 PROCEDURE — 99213 OFFICE O/P EST LOW 20 MIN: CPT | Performed by: OBSTETRICS & GYNECOLOGY

## 2024-08-22 RX ORDER — ESTRADIOL 1 MG/1
1 TABLET ORAL DAILY
Qty: 90 TABLET | Refills: 2 | Status: SHIPPED | OUTPATIENT
Start: 2024-08-22

## 2024-08-22 NOTE — PROGRESS NOTES
Assessment/Plan:      Diagnoses and all orders for this visit:    Vasomotor symptoms due to menopause  -     estradiol (Estrace) 1 mg tablet; Take 1 tablet (1 mg total) by mouth daily          Subjective:     Patient ID: Linda Galindo is a 54 y.o. female.    Patient is a 54-year-old female who presents today for follow-up evaluation to discuss efficacy of estrogen replacement therapy which was started several months ago.    Patient was begun on Estrace 0.05 mg on a daily basis since she is status post hysterectomy and reports that she achieved fairly good relief of her severe vasomotor symptoms particularly her hot flashes.    However she said that she still feels that she may be able to achieve better resolution with the higher dose.    We discussed those options and she is comfortable with initiating Estrace 0.1 mg on a daily basis.    I sent a prescription to the pharmacy for the new dose for her.    We discussed all risks benefits of estrogen replacement therapy    All questions were answered in detail for her during today's visit    I will see her back in approximately 6 months for follow-up evaluation.    She was told to feel free to call for any problems, questions, issues or concerns which may arise for her during the interim.      Total time of today's visit was 20 minutes of which greater than 50% was spent face-to-face counseling the patient as well as coordination of care, review of chart and lab values, physical examination as well as computer entry into the [employer] medical record system.            Review of Systems   Constitutional: Negative.  Negative for appetite change, diaphoresis, fatigue, fever and unexpected weight change.   HENT: Negative.     Eyes: Negative.    Respiratory: Negative.     Cardiovascular: Negative.    Gastrointestinal: Negative.  Negative for abdominal pain, blood in stool, constipation, diarrhea, nausea and vomiting.   Endocrine: Negative.  Negative for cold intolerance and  heat intolerance.   Genitourinary: Negative.  Negative for dysuria, frequency, hematuria, urgency, vaginal bleeding, vaginal discharge and vaginal pain.   Musculoskeletal: Negative.    Skin: Negative.    Allergic/Immunologic: Negative.    Neurological: Negative.    Hematological: Negative.  Negative for adenopathy.   Psychiatric/Behavioral: Negative.           Objective:     Physical Exam  Constitutional:       Appearance: She is well-developed.   HENT:      Head: Normocephalic.   Eyes:      Pupils: Pupils are equal, round, and reactive to light.   Cardiovascular:      Rate and Rhythm: Normal rate.   Pulmonary:      Effort: Pulmonary effort is normal.   Musculoskeletal:         General: Normal range of motion.      Cervical back: Normal range of motion and neck supple.   Skin:     General: Skin is warm and dry.   Neurological:      General: No focal deficit present.      Mental Status: She is alert and oriented to person, place, and time.   Psychiatric:         Mood and Affect: Mood normal.         Behavior: Behavior normal.         Thought Content: Thought content normal.         Judgment: Judgment normal.

## 2024-08-22 NOTE — PATIENT INSTRUCTIONS
Topic: Vasomotor symptoms particularly hot flashes on estrogen replacement therapy    Patient reports dramatic relief of the discomfort secondary to the hot flashes after initiating estrogen replacement.  However she feels that she may benefit from an adjustment upward to achieving complete relief.    I discussed increasing the Estrace to 1 mg daily and she is very comfortable with it and aware of risk benefits of estrogen replacement therapy.    Prescription refill sent to her pharmacy for Estrace 1 mg dispensing 90 tablets 1 tablet daily with 2 refills.    Will see her back in 6 months for her annual gynecologic and medical exam    Patient knows to call for any problems, questions, issues or concerns which may arise for her

## 2024-09-04 NOTE — TELEPHONE ENCOUNTER
[facility]   HOSPITAL MEDICINE DISCHARGE SUMMARY   Admission Date: 8/30/2024  PCP: Michael Pierre MD    Discharge Date: 9/4/2024 Discharge Provider: Ellie Huitron*     ADMISSION INFORMATION   Reason For Admission: Hypokalemia [E87.6]  New onset atrial fibrillation  (CMD) [I48.91]  Elevated troponin [R79.89]  Acute kidney injury (CMD) [N17.9]    Final Discharge Diagnoses:   #. New onset A-fib  #. Hypokalemia; resolved   #. Elevated troponin likely 2/2 to above   #. New onset HFmREF  #. KAYLA on CKD stage 3a; improved     Other chronic medical conditions   #. Hypertension   #. Asthma   Smoking status: Not a Tobacco User  Body mass index is 34.87 kg/m².: Patient is obese with BMI 30-40    Code Status on Discharge: Full Resuscitation  TCM FOLLOW UP     Disposition: Home  Readmission Risk Score (%) = 11.09    Medication Changes and Reason:      Summary of your Discharge Medications        Take these Medications        Details   albuterol 108 (90 Base) MCG/ACT inhaler   USE 2 INHALATIONS BY MOUTH EVERY 4 HOURS AS NEEDED FOR WHEEZING  OR SHORTNESS OF BREATH  Comment: Requesting 1 year supply     B Complex-Vitamin B12 Tab   Take 1 tablet by mouth daily     benzonatate 100 MG capsule  Commonly known as: TESSALON PERLES   Take 1 capsule by mouth 3 times daily as needed for Cough.     cholecalciferol 25 mcg(1,000 units) tablet  Commonly known as: VITAMIN D   Take 1 tablet by mouth daily.     Eliquis 5 MG Tab   Generic drug: apixaBAN  Take 1 tablet by mouth every 12 hours.     furosemide 40 MG tablet  Commonly known as: LASIX   Take 1 tablet by mouth daily.     Jardiance 10 MG tablet   Generic drug: empagliflozin  Take 1 tablet by mouth daily (before breakfast).     losartan 25 MG tablet  Commonly known as: COZAAR   Take one-HALF tablet by mouth daily.     metoPROLOL succinate 25 MG 24 hr tablet  Commonly known as: TOPROL-XL   Take 1 tablet by mouth daily.     ondansetron 4 MG  Reached pt to offer Therapy Anywhere appt. LVM for pt to contact intake dept.    disintegrating tablet  Commonly known as: ZOFRAN ODT   Place 1 tablet onto the tongue every 8 hours as needed for Nausea.     tretinoin 0.05 % cream  Commonly known as: RETIN-A   apply a pea-sized amount to face at bedtime.  Start every other night and increase as tolerated.     vitamin - therapeutic multivitamin capsule   Take 1 capsule by mouth daily.     Zepbound 7.5 MG/0.5ML Solution Auto-injector   Generic drug: Tirzepatide-Weight Management  Inject 7.5 mg into the skin every 7 days. Indications: OBESITY                For Discharge Medications: see after visit summary for full list     Tests Pending or Requiring Follow Up:     None.    Needs Further Goals of Care Discussion:   No   Home Health Referral:   No       FOLLOW UP APPOINTMENTS   No follow-up provider specified.  DISCHARGE INSTRUCTIONS     Activity Instructions: Normal activity as tolerated    HOSPITAL COURSE   Admission Narrative  Radha Moreno is a 73 year old female with a past medical history of asthma, Hypertension, Depression/Anxiety, breast cancer s/p chemotherapy and radiation who presented to [facility] on 8/31/24 via urgent care for evaluation of new onset A-fib.  On presentation to the ED vitals were unremarkable.Labs were notable for potassium of 3, creatinine 1.5, NT proBNP 1589, high-sensitivity troponin of 55.  EKG was sinus rhythm with nonspecific T wave abnormality.  Chest x-ray with no acute cardiopulmonary abnormality.  She received high-dose aspirin and 40 mEq of potassium and was admitted to the hospital for further management. On admission, she underwent TTE with findings of mild globally reduced left ventricular systolic function with LVEF of 45%. Cardiology was consulted and she underwent a stress test which showed no evidence of myocardial ischemia or infarction and low cardiac risk. She was diuresed with IV lasix and transitioned to PO once she was felt to be euvolemic. She was started on eliquis for new onset afib as well as  GDMT with jardiance, losartan and metoprolol. Once she was deemed to be medically and clinically stable, she was discharged home. She will follow up with Cardiology outpatient for further medication titration.     Note to PCP:  Patient recently started on losartan and lasix. Please check BMP during next visit to ensure stable lytes and creatinine.         Consultants:  IP CONSULT TO CARDIOLOGY     Surgical Procedures:       DISCHARGE PHYSICAL EXAM     Blood pressure 125/80, pulse (!) 55, temperature 98 °F (36.7 °C), temperature source Oral, resp. rate 18, height 5' 6\" (1.676 m), weight 98 kg (216 lb 0.8 oz), SpO2 97%.     Physical Exam  Constitutional:       Appearance: She is obese. She is not ill-appearing.   HENT:      Head: Normocephalic and atraumatic.      Mouth/Throat:      Mouth: Mucous membranes are moist.   Eyes:      General: No scleral icterus.     Conjunctiva/sclera: Conjunctivae normal.   Cardiovascular:      Rate and Rhythm: Normal rate and regular rhythm.      Pulses: Normal pulses.      Heart sounds: Normal heart sounds.   Pulmonary:      Effort: Pulmonary effort is normal. No respiratory distress.      Breath sounds: Normal breath sounds. No wheezing or rales.   Abdominal:      General: Bowel sounds are normal. There is no distension.      Tenderness: There is no abdominal tenderness. There is no guarding.   Musculoskeletal:      Right lower leg: No edema.      Left lower leg: No edema.   Skin:     General: Skin is warm.   Neurological:      General: No focal deficit present.      Mental Status: She is alert and oriented to person, place, and time. Mental status is at baseline.      Motor: No weakness.   Psychiatric:         Mood and Affect: Mood normal.         Thought Content: Thought content normal.     Wt Readings from Last 3 Encounters:   09/04/24 98 kg (216 lb 0.8 oz)   08/30/24 98.1 kg (216 lb 4.8 oz)   07/22/24 101.2 kg (223 lb)     SIGNIFICANT DIAGNOSTIC STUDIES     Laboratory values:    Recent Labs   Lab 08/31/24  0411 08/30/24  1603   WBC 6.5 6.7   HGB 12.2 14.0   HCT 38.9 44.6    361         Recent Labs   Lab 09/04/24  0624 09/03/24  0608 09/02/24  1252 09/02/24  0519 08/30/24  1607 08/30/24  1603   SODIUM 138 137  --  140   < > 139   POTASSIUM 3.8 3.7 4.2 3.4  3.4   < > 3.0*   CHLORIDE 103 104  --  106   < > 101   CO2 29 27  --  28   < > 30   CALCIUM 10.1 10.4*  --  10.1   < > 10.2   GLUCOSE 90 93  --  89   < > 107*   BUN 21* 17  --  18   < > 19   CREATININE 1.14* 0.98*  --  1.08*   < > 1.50*   MG  --   --   --   --   --  2.2    < > = values in this interval not displayed.        Recent Labs   Lab 08/30/24  1603   ALBUMIN 3.4*   AST 23   GPT 25   BILIRUBIN 0.5     No results found    Time taken to coordinate discharge care:  Discharge Time: More then 30 minutes  Discharge instructions, medications and followup appointment were discussed with the patient and after visit summary was given.     DO PANTERA Salazar Hospitalist

## 2024-09-27 ENCOUNTER — TELEPHONE (OUTPATIENT)
Dept: PSYCHIATRY | Facility: CLINIC | Age: 54
End: 2024-09-27

## 2024-09-27 NOTE — TELEPHONE ENCOUNTER
One week follow up call for New Patient appointment with Nini Mendoza [[id number]] on 11/20/24  was made on 9/20/24. Writer informed patient of New Patient paperwork needing to be completed 5 days prior to the appointment. Writer confirmed paperwork has been sent via My Chart.

## 2024-10-28 ENCOUNTER — TELEPHONE (OUTPATIENT)
Dept: PSYCHIATRY | Facility: CLINIC | Age: 54
End: 2024-10-28

## 2024-10-28 NOTE — TELEPHONE ENCOUNTER
Called and spoke with pt to move up her intake appt with Nini up to any open time sooner. Pt could not move due to her work schedule

## 2024-11-11 ENCOUNTER — TELEPHONE (OUTPATIENT)
Dept: PSYCHIATRY | Facility: CLINIC | Age: 54
End: 2024-11-11

## 2024-11-11 NOTE — TELEPHONE ENCOUNTER
Patient returned call to say that she works in [place] and will be able to do her appt virtual on 11/20 with Nini Mendoza because she will be at work that day.

## 2024-11-11 NOTE — TELEPHONE ENCOUNTER
Writer called and lvm that her appt on 11/20/2024 with Nini must be in person due to Nini is not licensed in [place]

## 2024-11-20 ENCOUNTER — TELEMEDICINE (OUTPATIENT)
Dept: BEHAVIORAL/MENTAL HEALTH CLINIC | Facility: CLINIC | Age: 54
End: 2024-11-20
Payer: COMMERCIAL

## 2024-11-20 DIAGNOSIS — F33.1 MAJOR DEPRESSIVE DISORDER, RECURRENT EPISODE, MODERATE (HCC): Primary | ICD-10-CM

## 2024-11-20 PROCEDURE — 90791 PSYCH DIAGNOSTIC EVALUATION: CPT | Performed by: SOCIAL WORKER

## 2024-11-27 NOTE — PSYCH
" Behavioral Health Psychotherapy Assessment    Date of Initial Psychotherapy Assessment: 11/27/24  Referral Source: Self  Has a release of information been signed for the referral source? NA    Preferred Name: Linda Galindo  Preferred Pronouns: She/her  YOB: 1970 Age: 54 y.o.  Sex assigned at birth: female   Gender Identity: Female  Race:   Preferred Language: English    Emergency Contact:  Full Name: Isabell Sanders  Relationship to Client: Sister  Contact information: [phone]    Primary Care Physician:  Moni Carney DO  [facility] [address] [place] [address]  [phone]  Has a release of information been signed? No    Physical Health History:  Past surgical procedures: none  Do you have a history of any of the following: diabetes  Do you have any mobility issues? No    Relevant Family History:  Linda is the oldest of two daughters to her biological parents. They have been  for 56 years and have a good connection. Her mother stayed home with the kids when they were young then performed clerical work when they went off to school. Her father was a  and business owner. Linda has a good relationship with her sister and denies any major strains.   She has had two failed marriages and does not have kids. She does not have any regrets about not having children as she enjoys her free time and ability to do what she wants when ever she wants. She does; however, have concerns about the men that she picks and questions, \"what is it about me that I allow certain men in my life\".      Presenting Problem (What brings you in?)  Linda is struggling with depressive moods, feeling easily discouraged, has unprovoked crying spells, and ruminating thoughts about the past. She has been struggling with these symptoms on and off her entire adult life but feels that it has recently worsened due to a recent break up with a man who apparently \"had a whole other life\". She is feeling betrayed, " embarrassed, and angry. She would like her relationships to be the focus of her treatment.     Mental Health Advance Directive:  Do you currently have a Mental Health Advance Directive?no    Diagnosis:   Diagnosis ICD-10-CM Associated Orders   1. Major depressive disorder, recurrent episode, moderate (HCC)  F33.1           Initial Assessment:     Current Mental Status:    Appearance: appropriate and casual      Behavior/Manner: cooperative and tearful      Affect/Mood:  Sad    Speech:  Normal and talkative    Sleep:  Normal    Oriented to: oriented to self, oriented to place and oriented to time       Clinical Symptoms    Depression: yes      Depression Symptoms: depressed mood and excessive crying      Have you ever been assaultive to others or the environment: No      Have you ever been self-injurious: No      Counseling History:  Previous Counseling or Treatment  (Mental Health or Drug & Alcohol): Yes    Previous Counseling Details:  Linda reports being in outpatient therapy on and off throughout her adult life. She reports benefiting from cognitive process and engages easily with providers, as per her report.   Have you previously taken psychiatric medications: Yes    Previous Medications Attempted:  Prozac    Suicide Risk Assessment  Have you ever had a suicide attempt: No    Have you had incidents of suicidal ideation: No    Are you currently experiencing suicidal thoughts: No      Substance Abuse/Addiction Assessment:  Alcohol: No    Heroin: No    Fentanyl: No    Opiates: No    Cocaine: No    Amphetamines: No    Hallucinogens: No    Club Drugs: No    Benzodiazepines: No    Other Rx Meds: No    Marijuana: Yes    Amount:  Linda admits to periodic usage of THC gummies to help calm her down  Tobacco/Nicotine: No    Have you experienced blackouts as a result of substance use: No    Have you had any periods of abstinence: No    Have you experienced symptoms of withdrawal: No    Have you ever overdosed on any  substances?: No    Are you currently using any Medication Assisted Treatment for Substance Use: No      Compulsive Behaviors:  Compulsive Behavior Information:  None reported.     Disordered Eating History:  Do you have a history of disordered eating: No      Social Determinants of Health:    SDOH:  Stress    Trauma and Abuse History:    Have you ever been abused: No      Legal History:    Have you ever been arrested  or had a DUI: No      Have you been incarcerated: No      Are you currently on parole/probation: No      Any current Children and Youth involvement: No      Any pending legal charges: No      Relationship History:    Current marital status:       Natural Supports:  Mother, father, siblings and friends    Relationship History:  Linda has an easy time making friends and networking within her profession. She denies any issues with bullying.     Employment History    Are you currently employed: Yes      Employer/ Job title:  [employer]    Sources of income/financial support:  Work     History:      Status: no history of  duty  Educational History:     Have you ever been diagnosed with a learning disability: No      Highest level of education:  Some college    Have you ever had an IEP or 504-plan: No      Do you need assistance with reading or writing: No      Recommended Treatment:     Psychotherapy:  Individual sessions    Frequency:  2 times    Session frequency:  Monthly    Visit start and stop times:    11/27/24  Start Time: 1200  Stop Time: 1300  Total Visit Time: 60 minutes

## 2024-12-12 ENCOUNTER — TELEMEDICINE (OUTPATIENT)
Dept: BEHAVIORAL/MENTAL HEALTH CLINIC | Facility: CLINIC | Age: 54
End: 2024-12-12
Payer: COMMERCIAL

## 2024-12-12 DIAGNOSIS — F33.1 MAJOR DEPRESSIVE DISORDER, RECURRENT EPISODE, MODERATE (HCC): Primary | ICD-10-CM

## 2024-12-12 PROCEDURE — 90834 PSYTX W PT 45 MINUTES: CPT | Performed by: SOCIAL WORKER

## 2024-12-16 NOTE — PSYCH
Virtual Regular Visit    Verification of patient location:    Patient is located at Home in the following state in which I hold an active license [place]      Assessment/Plan:    Problem List Items Addressed This Visit       Major depressive disorder, recurrent episode, moderate (HCC) - Primary       Goals addressed in session: Goal 1     Depression Follow-up Plan Completed: Not applicable    Reason for visit is No chief complaint on file.       Encounter provider Nini Mendoza LCSW      Recent Visits  Date Type Provider Dept   12/12/24 Telemedicine Nini Mendoza LCSW [facility] Therapist [facility]   Showing recent visits within past 7 days and meeting all other requirements  Future Appointments  No visits were found meeting these conditions.  Showing future appointments within next 150 days and meeting all other requirements       The patient was identified by name and date of birth. Linda Galindo was informed that this is a telemedicine visit and that the visit is being conducted throughthe Epic Embedded platform. She agrees to proceed..  My office door was closed. No one else was in the room.  She acknowledged consent and understanding of privacy and security of the video platform. The patient has agreed to participate and understands they can discontinue the visit at any time.    Patient is aware this is a billable service.     Subjective  Linda Galindo is a 54 y.o. female  .      HPI     Past Medical History:   Diagnosis Date    Abnormal Pap smear of cervix     Anxiety     BRCA1 negative     BRCA2 negative     Depression        Past Surgical History:   Procedure Laterality Date    COLPOSCOPY      HYSTERECTOMY  2012    OOPHORECTOMY Left 2012    one ovary remaining       Current Outpatient Medications   Medication Sig Dispense Refill    5-Hydroxytryptophan 100 MG CAPS 5- mg capsule   Take by oral route. (Patient not taking: Reported on 4/11/2023)      amitriptyline (ELAVIL) 10 mg  tablet Take 10 mg by mouth daily at bedtime (Patient not taking: Reported on 3/22/2022 )      amoxicillin (AMOXIL) 500 mg capsule TAKE 2 CAPSULES BY MOUTH AFTER SURGERY AND THEN 1 CAPSULE 3 TIMES A DAY UNTIL FINISHED. (Patient not taking: Reported on 4/22/2024)      Ascorbic Acid (VITAMIN C) 100 MG CHEW Vitamin C (Patient not taking: Reported on 4/11/2023)      Ascorbic Acid (Vitamin C) 100 MG CHEW 1,000 mg daily      Black Cohosh-Flaxseed-Soy ER -100 MG TBCR Take by mouth daily      Calcium 500-100 MG-UNIT CHEW 600 mg daily      Cholecalciferol (VITAMIN D3) 5000 units CAPS Vitamin D3      coenzyme Q-10 100 MG capsule Co Q-10      Coenzyme Q10 10 MG capsule Co Q-10 (Patient not taking: Reported on 4/11/2023)      Cyanocobalamin 1000 MCG LOZG cyanocobalamin (vit B-12) 1,000 mcg sublingual lozenge   Place by sublingual route.      cyclobenzaprine (FLEXERIL) 10 mg tablet  (Patient not taking: Reported on 4/22/2024)      desvenlafaxine succinate (PRISTIQ) 50 mg 24 hr tablet Take 50 mg by mouth daily      desvenlafaxine succinate (PRISTIQ) 50 mg 24 hr tablet Daily (Patient not taking: Reported on 4/11/2023)      DULoxetine (CYMBALTA) 20 mg capsule Take 20 mg by mouth 2 (two) times a day (Patient not taking: Reported on 4/11/2023)      DULoxetine (CYMBALTA) 30 mg delayed release capsule Take 30 mg by mouth daily  (Patient not taking: Reported on 3/22/2022 )      escitalopram (LEXAPRO) 20 mg tablet  (Patient not taking: Reported on 4/22/2024)      estradiol (Estrace) 1 mg tablet Take 1 tablet (1 mg total) by mouth daily 90 tablet 2    Evening Primrose Oil 500 MG CAPS       hydrOXYzine HCL (ATARAX) 25 mg tablet Take 25 mg by mouth 3 (three) times a day (Patient not taking: Reported on 3/22/2022 )      Magnesium 100 MG CAPS magnesium (Patient not taking: Reported on 4/11/2023)      MAGNESIUM PO Take by mouth daily      Multiple Vitamin (MULTI-VITAMIN DAILY PO) Multi Vitamin      Na Sulfate-K Sulfate-Mg Sulf (Suprep  Bowel Prep Kit) 17.5-3.13-1.6 GM/177ML SOLN Take as directed by office (Patient not taking: Reported on 9/7/2021) 2 Bottle 0    Nutritional Supplements (ESTROVEN PO) Take by mouth daily      Omega-3 Fatty Acids (Fish Oil) 1200 MG CAPS       Phosphatidylserine 100 MG CAPS phosphatidylserine   take one daily (Patient not taking: Reported on 4/11/2023)      Probiotic Product (PROBIOTIC PO) Take by mouth daily      S-Acetylglutathione 100 MG CPDR S-acetylglutathione   Two capsules three times weekly (Patient not taking: Reported on 4/11/2023)      sertraline (ZOLOFT) 100 mg tablet Take 100 mg by mouth daily (Patient not taking: Reported on 4/22/2024)      sertraline (ZOLOFT) 50 mg tablet Take 50 mg by mouth daily      Turmeric 500 MG CAPS       valACYclovir (VALTREX) 500 mg tablet Take 1 tablet (500 mg total) by mouth daily for 360 doses 90 tablet 3    VITAMIN B COMPLEX-C PO       Zinc 50 MG TABS zinc 50 mg tablet   once a day (Patient not taking: Reported on 6/5/2023)      zolpidem (AMBIEN) 5 mg tablet Take 1 tablet as needed by oral route at bedtime. (Patient not taking: Reported on 4/22/2024)       No current facility-administered medications for this visit.        Allergies   Allergen Reactions    Sulfamethoxazole-Trimethoprim Hives       Review of Systems    Video Exam    There were no vitals filed for this visit.    Physical Exam     Visit Time    Visit Start Time: 13:00  Visit Stop Time: 13:50  Total Visit Duration:  50 minutes        Behavioral Health Psychotherapy Progress Note    Psychotherapy Provided: Individual Psychotherapy     1. Major depressive disorder, recurrent episode, moderate (HCC)            Goals addressed in session: Goal 1     DATA: Linda reports feeling well since being back on Prozac. She reported crying less and feeling overall more in control of her emotions. We talked about her relationships and how she views herself within them differently when she is on medication because she is not as  "hard on herself. While she does recognize a pattern in picking men who are not available to her, she is less worked up about it and more tolerant of her decisions whether that be good or bad. Does continue to want to explore her past relationships though in the hope of being able to understand more clearly why she picks the men that she does, and what she is willing to do in the future at first sight of \"red flags\". No new symptoms or safety concerns. Return in 2 weeks.   During this session, this clinician used the following therapeutic modalities: Client-centered Therapy    Substance Abuse was not addressed during this session. If the client is diagnosed with a co-occurring substance use disorder, please indicate any changes in the frequency or amount of use: NA. Stage of change for addressing substance use diagnoses: No substance use/Not applicable    ASSESSMENT:  Linda Galindo presents with a Euthymic/ normal mood.     her affect is Normal range and intensity, which is congruent, with her mood and the content of the session. The client has made progress on their goals.    During this session the client was oriented to person, place, and time. The client was engaged in the session. The client was able to sustain direct eye contact and was without psychomotor agitation. The client's thought processes were linear and clear.   Linda Galindo presents with a none risk of suicide, none risk of self-harm, and none risk of harm to others.    For any risk assessment that surpasses a \"low\" rating, a safety plan must be developed.    A safety plan was indicated: no  If yes, describe in detail NA    PLAN: Between sessions, Linda Galindo will take medication as prescribed and practice positive coping strategies as needed . At the next session, the therapist will use Client-centered Therapy to address stressors.    Behavioral Health Treatment Plan and Discharge Planning: Linda Galindo is aware of and agrees to " continue to work on their treatment plan. They have identified and are working toward their discharge goals. no    Depression Follow-up Plan Completed: Not applicable    Visit start and stop times:    12/16/24  Start Time: 1300  Stop Time: 1350  Total Visit Time: 50 minutes

## 2024-12-23 ENCOUNTER — TELEMEDICINE (OUTPATIENT)
Dept: BEHAVIORAL/MENTAL HEALTH CLINIC | Facility: CLINIC | Age: 54
End: 2024-12-23
Payer: COMMERCIAL

## 2024-12-23 ENCOUNTER — TELEPHONE (OUTPATIENT)
Dept: PSYCHIATRY | Facility: CLINIC | Age: 54
End: 2024-12-23

## 2024-12-23 DIAGNOSIS — F33.1 MAJOR DEPRESSIVE DISORDER, RECURRENT EPISODE, MODERATE (HCC): Primary | ICD-10-CM

## 2024-12-23 PROCEDURE — 90834 PSYTX W PT 45 MINUTES: CPT | Performed by: SOCIAL WORKER

## 2024-12-23 NOTE — PSYCH
Virtual Regular Visit    Verification of patient location:    Patient is located at Home in the following state in which I hold an active license [place]      Assessment/Plan:    Problem List Items Addressed This Visit       Major depressive disorder, recurrent episode, moderate (HCC) - Primary       Goals addressed in session: Goal 1     Depression Follow-up Plan Completed: Not applicable    Reason for visit is No chief complaint on file.       Encounter provider Nini Mendoza LCSW      Recent Visits  No visits were found meeting these conditions.  Showing recent visits within past 7 days and meeting all other requirements  Today's Visits  Date Type Provider Dept   12/23/24 Telemedicine Nini Mendoza LCSW [facility] Therapist [place]   Showing today's visits and meeting all other requirements  Future Appointments  No visits were found meeting these conditions.  Showing future appointments within next 150 days and meeting all other requirements       The patient was identified by name and date of birth. Linda Galindo was informed that this is a telemedicine visit and that the visit is being conducted throughthe Epic Embedded platform. She agrees to proceed..  My office door was closed. No one else was in the room.  She acknowledged consent and understanding of privacy and security of the video platform. The patient has agreed to participate and understands they can discontinue the visit at any time.    Patient is aware this is a billable service.     Subjective  Linda Galindo is a 54 y.o. female  .      HPI     Past Medical History:   Diagnosis Date    Abnormal Pap smear of cervix     Anxiety     BRCA1 negative     BRCA2 negative     Depression        Past Surgical History:   Procedure Laterality Date    COLPOSCOPY      HYSTERECTOMY  2012    OOPHORECTOMY Left 2012    one ovary remaining       Current Outpatient Medications   Medication Sig Dispense Refill    5-Hydroxytryptophan 100 MG  CAPS 5- mg capsule   Take by oral route. (Patient not taking: Reported on 4/11/2023)      amitriptyline (ELAVIL) 10 mg tablet Take 10 mg by mouth daily at bedtime (Patient not taking: Reported on 3/22/2022 )      amoxicillin (AMOXIL) 500 mg capsule TAKE 2 CAPSULES BY MOUTH AFTER SURGERY AND THEN 1 CAPSULE 3 TIMES A DAY UNTIL FINISHED. (Patient not taking: Reported on 4/22/2024)      Ascorbic Acid (VITAMIN C) 100 MG CHEW Vitamin C (Patient not taking: Reported on 4/11/2023)      Ascorbic Acid (Vitamin C) 100 MG CHEW 1,000 mg daily      Black Cohosh-Flaxseed-Soy ER -100 MG TBCR Take by mouth daily      Calcium 500-100 MG-UNIT CHEW 600 mg daily      Cholecalciferol (VITAMIN D3) 5000 units CAPS Vitamin D3      coenzyme Q-10 100 MG capsule Co Q-10      Coenzyme Q10 10 MG capsule Co Q-10 (Patient not taking: Reported on 4/11/2023)      Cyanocobalamin 1000 MCG LOZG cyanocobalamin (vit B-12) 1,000 mcg sublingual lozenge   Place by sublingual route.      cyclobenzaprine (FLEXERIL) 10 mg tablet  (Patient not taking: Reported on 4/22/2024)      desvenlafaxine succinate (PRISTIQ) 50 mg 24 hr tablet Take 50 mg by mouth daily      desvenlafaxine succinate (PRISTIQ) 50 mg 24 hr tablet Daily (Patient not taking: Reported on 4/11/2023)      DULoxetine (CYMBALTA) 20 mg capsule Take 20 mg by mouth 2 (two) times a day (Patient not taking: Reported on 4/11/2023)      DULoxetine (CYMBALTA) 30 mg delayed release capsule Take 30 mg by mouth daily  (Patient not taking: Reported on 3/22/2022 )      escitalopram (LEXAPRO) 20 mg tablet  (Patient not taking: Reported on 4/22/2024)      estradiol (Estrace) 1 mg tablet Take 1 tablet (1 mg total) by mouth daily 90 tablet 2    Evening Primrose Oil 500 MG CAPS       hydrOXYzine HCL (ATARAX) 25 mg tablet Take 25 mg by mouth 3 (three) times a day (Patient not taking: Reported on 3/22/2022 )      Magnesium 100 MG CAPS magnesium (Patient not taking: Reported on 4/11/2023)      MAGNESIUM PO  Take by mouth daily      Multiple Vitamin (MULTI-VITAMIN DAILY PO) Multi Vitamin      Na Sulfate-K Sulfate-Mg Sulf (Suprep Bowel Prep Kit) 17.5-3.13-1.6 GM/177ML SOLN Take as directed by office (Patient not taking: Reported on 9/7/2021) 2 Bottle 0    Nutritional Supplements (ESTROVEN PO) Take by mouth daily      Omega-3 Fatty Acids (Fish Oil) 1200 MG CAPS       Phosphatidylserine 100 MG CAPS phosphatidylserine   take one daily (Patient not taking: Reported on 4/11/2023)      Probiotic Product (PROBIOTIC PO) Take by mouth daily      S-Acetylglutathione 100 MG CPDR S-acetylglutathione   Two capsules three times weekly (Patient not taking: Reported on 4/11/2023)      sertraline (ZOLOFT) 100 mg tablet Take 100 mg by mouth daily (Patient not taking: Reported on 4/22/2024)      sertraline (ZOLOFT) 50 mg tablet Take 50 mg by mouth daily      Turmeric 500 MG CAPS       valACYclovir (VALTREX) 500 mg tablet Take 1 tablet (500 mg total) by mouth daily for 360 doses 90 tablet 3    VITAMIN B COMPLEX-C PO       Zinc 50 MG TABS zinc 50 mg tablet   once a day (Patient not taking: Reported on 6/5/2023)      zolpidem (AMBIEN) 5 mg tablet Take 1 tablet as needed by oral route at bedtime. (Patient not taking: Reported on 4/22/2024)       No current facility-administered medications for this visit.        Allergies   Allergen Reactions    Sulfamethoxazole-Trimethoprim Hives       Review of Systems    Video Exam    There were no vitals filed for this visit.    Physical Exam     Visit Time    Visit Start Time: 13:00   Visit Stop Time: 13:50  Total Visit Duration:  50 minutes        Behavioral Health Psychotherapy Progress Note    Psychotherapy Provided: Individual Psychotherapy     1. Major depressive disorder, recurrent episode, moderate (HCC)            Goals addressed in session: Goal 1     DATA: Linda reported feeling well stating that she has been focusing on her values and remaining true to who she is during this season of stress and  "unrealistic expectations. Discussed her past experiences with absorbing the stress around her and how she sees the same happening with her youngest niece. She has discussed with her parents and sister ways in which they can helpful to her niece seeing as how Linda relates to her, but unfortunately her mom is not willing to see this because as Linda understands now, her percepton of her niece is based on a black and white image of herself as well. Worked on developing compassion towards her mom as well as herself. Continues to benefit from process oriented therapy and expresses gratitude towards this writer. No new symptoms or safety concerns. Return in 2 weeks.   During this session, this clinician used the following therapeutic modalities: Cognitive Processing Therapy    Substance Abuse was not addressed during this session. If the client is diagnosed with a co-occurring substance use disorder, please indicate any changes in the frequency or amount of use: NA. Stage of change for addressing substance use diagnoses: No substance use/Not applicable    ASSESSMENT:  Linda Galindo presents with a Euthymic/ normal mood.     her affect is Normal range and intensity, which is congruent, with her mood and the content of the session. The client has made progress on their goals.    During this session the client was oriented to person, place, and time. The client was engaged in the session. The client was able to sustain direct eye contact and was without psychomotor agitation. The client's thought processes were linear and clear.   Linda Galindo presents with a none risk of suicide, none risk of self-harm, and none risk of harm to others.    For any risk assessment that surpasses a \"low\" rating, a safety plan must be developed.    A safety plan was indicated: no  If yes, describe in detail NA    PLAN: Between sessions, Linda Galindo will take medication as prescribed and practice positive coping strategies as needed . " At the next session, the therapist will use Cognitive Processing Therapy to address stressors.    Behavioral Health Treatment Plan and Discharge Planning: Linda Galindo is aware of and agrees to continue to work on their treatment plan. They have identified and are working toward their discharge goals. yes    Depression Follow-up Plan Completed: Not applicable    Visit start and stop times:    12/23/24  Start Time: 1300  Stop Time: 1350  Total Visit Time: 50 minutes

## 2024-12-23 NOTE — TELEPHONE ENCOUNTER
Called and lvm that Nini is working from home today due to her daughter is sick. If pt in not in [place] we will need to R/S her appt on 12/23/2024.  Waiting on call back

## 2024-12-23 NOTE — TELEPHONE ENCOUNTER
Patient called to say that she can change her appt to virtual because she works in [place] and is at work today-12/23/24.

## 2025-01-06 ENCOUNTER — TELEMEDICINE (OUTPATIENT)
Dept: BEHAVIORAL/MENTAL HEALTH CLINIC | Facility: CLINIC | Age: 55
End: 2025-01-06
Payer: COMMERCIAL

## 2025-01-06 DIAGNOSIS — F33.1 MAJOR DEPRESSIVE DISORDER, RECURRENT EPISODE, MODERATE (HCC): Primary | ICD-10-CM

## 2025-01-06 PROCEDURE — 90834 PSYTX W PT 45 MINUTES: CPT | Performed by: SOCIAL WORKER

## 2025-01-06 NOTE — BH CRISIS PLAN
Client Name: Linda Galindo       Client YOB: 1970    [facility] Safety Plan      Creation Date: 11/20/24 Update Date: 11/20/25   Created By: Nini Mendoza LCSW       Step 1: Warning Signs:   Warning Signs   Increased tearfulness   Low mood            Step 2: Internal Coping Strategies:   Internal Coping Strategies   Talking through            Step 3: People and social settings that provide distraction:   Name Contact Information   Sister In cell phone   Friends In cell phone            Step 4: People whom I can ask for help during a crisis:      Name Contact Information    Sister In Cell phone    Friends In cell phone      Step 5: Professionals or agencies I can contact during a crisis:      Clinican/Agency Name Phone Emergency Contact    [facility] [phone]       [facility] Emergency Department Emergency Department Phone Emergency Department Address    [facility]          Crisis Phone Numbers:   Suicide Prevention Lifeline: Call or Text  733 Crisis Text Line: Text HOME to 689-077   Please note: Some [facility] do not have a separate number for Child/Adolescent specific crisis. If your county is not listed under Child/Adolescent, please call the adult number for your county      Adult Crisis Numbers: Child/Adolescent Crisis Numbers   [facility]: [phone] [facility]: [phone]   [facility]: [phone] [facility]: [phone]   [facility]: [phone] [place]: [phone]   [facility]: [phone] [place]: [phone]   [place]: [phone]   [facility]: [phone]   [facility]: [phone]   [facility]: [phone] (daytime) [phone] (after hours, weekends, holidays)      Step 6: Making the environment safer (plan for lethal means safety):   Patient did not identify any lethal methods: Yes     Optional: What is most important to me and worth living  for?   Life, family     [facility] Safety Plan. Edwina Lyman and Bairon Oglesby. Used with permission of the authors.

## 2025-01-06 NOTE — BH TREATMENT PLAN
"Outpatient Behavioral Health Psychotherapy Treatment Plan    Linda Galindo  1970     Date of Initial Psychotherapy Assessment: 11/20/2024   Date of Current Treatment Plan: 11/20/2025  Treatment Plan Target Date: 5/20/2025  Treatment Plan Expiration Date: 5/20/2025    Diagnosis:   1. Major depressive disorder, recurrent episode, moderate (HCC)            Area(s) of Need: Reducing symptoms of depression following fall out of last relationship and improving insight into the role that she has played within her relationships.     Long Term Goal 1 (in the client's own words): \"I just want to understand better why I pick who I pick\".    Stage of Change: Contemplation    Target Date for completion: 5/20/2025     Anticipated therapeutic modalities: Psychoeducation, motivational interviewing, CBT, ACT, DBT, somatic exercises, mindfulness skills training, and supportive psychotherapy.      People identified to complete this goal: Linda Galindo and Nini Mendoza       Objective 1: (identify the means of measuring success in meeting the objective): Use at least 20 minutes of session time to process experiences in her relationships, both intimate as well as in family and friendships, to gain a better understanding of roles and themes.       Objective 2: (identify the means of measuring success in meeting the objective): Identify at least 2 ways in which she would like to change the way that she interacts with others.       I am currently under the care of a [facility] psychiatric provider: no    My [facility] psychiatric provider is: NA    I am currently taking psychiatric medications: Yes, as prescribed    I feel that I will be ready for discharge from mental health care when I reach the following (measurable goal/objective): I honestly don't know.    For children and adults who have a legal guardian:   Has there been any change to custody orders and/or guardianship status? NA. If yes, attach updated " documentation.    I have created my Crisis Plan and have been offered a copy of this plan    Behavioral Health Treatment Plan [facility]: Diagnosis and Treatment Plan explained to Linda Galindo acknowledges an understanding of their diagnosis. Linda Galindo agrees to this treatment plan.    I have been offered a copy of this Treatment Plan. yes

## 2025-01-06 NOTE — PSYCH
Virtual Regular Visit    Verification of patient location:    Patient is located at Home in the following state in which I hold an active license [place]      Assessment/Plan:    Problem List Items Addressed This Visit       Major depressive disorder, recurrent episode, moderate (HCC) - Primary       Goals addressed in session: Goal 1     Depression Follow-up Plan Completed: Not applicable    Reason for visit is No chief complaint on file.       Encounter provider Nini Mendoza LCSW      Recent Visits  No visits were found meeting these conditions.  Showing recent visits within past 7 days and meeting all other requirements  Today's Visits  Date Type Provider Dept   01/06/25 Telemedicine Nini Mendoza LCSW [facility] Therapist [place]   Showing today's visits and meeting all other requirements  Future Appointments  No visits were found meeting these conditions.  Showing future appointments within next 150 days and meeting all other requirements       The patient was identified by name and date of birth. Linda Galindo was informed that this is a telemedicine visit and that the visit is being conducted throughthe Epic Embedded platform. She agrees to proceed..  My office door was closed. No one else was in the room.  She acknowledged consent and understanding of privacy and security of the video platform. The patient has agreed to participate and understands they can discontinue the visit at any time.    Patient is aware this is a billable service.     Subjective  Linda Galindo is a 54 y.o. female  .      HPI     Past Medical History:   Diagnosis Date    Abnormal Pap smear of cervix     Anxiety     BRCA1 negative     BRCA2 negative     Depression        Past Surgical History:   Procedure Laterality Date    COLPOSCOPY      HYSTERECTOMY  2012    OOPHORECTOMY Left 2012    one ovary remaining       Current Outpatient Medications   Medication Sig Dispense Refill    5-Hydroxytryptophan 100 MG  CAPS 5- mg capsule   Take by oral route. (Patient not taking: Reported on 4/11/2023)      amitriptyline (ELAVIL) 10 mg tablet Take 10 mg by mouth daily at bedtime (Patient not taking: Reported on 3/22/2022 )      amoxicillin (AMOXIL) 500 mg capsule TAKE 2 CAPSULES BY MOUTH AFTER SURGERY AND THEN 1 CAPSULE 3 TIMES A DAY UNTIL FINISHED. (Patient not taking: Reported on 4/22/2024)      Ascorbic Acid (VITAMIN C) 100 MG CHEW Vitamin C (Patient not taking: Reported on 4/11/2023)      Ascorbic Acid (Vitamin C) 100 MG CHEW 1,000 mg daily      Black Cohosh-Flaxseed-Soy ER -100 MG TBCR Take by mouth daily      Calcium 500-100 MG-UNIT CHEW 600 mg daily      Cholecalciferol (VITAMIN D3) 5000 units CAPS Vitamin D3      coenzyme Q-10 100 MG capsule Co Q-10      Coenzyme Q10 10 MG capsule Co Q-10 (Patient not taking: Reported on 4/11/2023)      Cyanocobalamin 1000 MCG LOZG cyanocobalamin (vit B-12) 1,000 mcg sublingual lozenge   Place by sublingual route.      cyclobenzaprine (FLEXERIL) 10 mg tablet  (Patient not taking: Reported on 4/22/2024)      desvenlafaxine succinate (PRISTIQ) 50 mg 24 hr tablet Take 50 mg by mouth daily      desvenlafaxine succinate (PRISTIQ) 50 mg 24 hr tablet Daily (Patient not taking: Reported on 4/11/2023)      DULoxetine (CYMBALTA) 20 mg capsule Take 20 mg by mouth 2 (two) times a day (Patient not taking: Reported on 4/11/2023)      DULoxetine (CYMBALTA) 30 mg delayed release capsule Take 30 mg by mouth daily  (Patient not taking: Reported on 3/22/2022 )      escitalopram (LEXAPRO) 20 mg tablet  (Patient not taking: Reported on 4/22/2024)      estradiol (Estrace) 1 mg tablet Take 1 tablet (1 mg total) by mouth daily 90 tablet 2    Evening Primrose Oil 500 MG CAPS       hydrOXYzine HCL (ATARAX) 25 mg tablet Take 25 mg by mouth 3 (three) times a day (Patient not taking: Reported on 3/22/2022 )      Magnesium 100 MG CAPS magnesium (Patient not taking: Reported on 4/11/2023)      MAGNESIUM PO  Take by mouth daily      Multiple Vitamin (MULTI-VITAMIN DAILY PO) Multi Vitamin      Na Sulfate-K Sulfate-Mg Sulf (Suprep Bowel Prep Kit) 17.5-3.13-1.6 GM/177ML SOLN Take as directed by office (Patient not taking: Reported on 9/7/2021) 2 Bottle 0    Nutritional Supplements (ESTROVEN PO) Take by mouth daily      Omega-3 Fatty Acids (Fish Oil) 1200 MG CAPS       Phosphatidylserine 100 MG CAPS phosphatidylserine   take one daily (Patient not taking: Reported on 4/11/2023)      Probiotic Product (PROBIOTIC PO) Take by mouth daily      S-Acetylglutathione 100 MG CPDR S-acetylglutathione   Two capsules three times weekly (Patient not taking: Reported on 4/11/2023)      sertraline (ZOLOFT) 100 mg tablet Take 100 mg by mouth daily (Patient not taking: Reported on 4/22/2024)      sertraline (ZOLOFT) 50 mg tablet Take 50 mg by mouth daily      Turmeric 500 MG CAPS       valACYclovir (VALTREX) 500 mg tablet Take 1 tablet (500 mg total) by mouth daily for 360 doses 90 tablet 3    VITAMIN B COMPLEX-C PO       Zinc 50 MG TABS zinc 50 mg tablet   once a day (Patient not taking: Reported on 6/5/2023)      zolpidem (AMBIEN) 5 mg tablet Take 1 tablet as needed by oral route at bedtime. (Patient not taking: Reported on 4/22/2024)       No current facility-administered medications for this visit.        Allergies   Allergen Reactions    Sulfamethoxazole-Trimethoprim Hives       Review of Systems    Video Exam    There were no vitals filed for this visit.    Physical Exam     Visit Time    Visit Start Time: 13:00   Visit Stop Time: 13:50  Total Visit Duration:  50 minutes        Behavioral Health Psychotherapy Progress Note    Psychotherapy Provided: Individual Psychotherapy     1. Major depressive disorder, recurrent episode, moderate (HCC)            Goals addressed in session: Goal 1     DATA: Linda reports feeling well overall stating that she feels like she is in a good place to start for 2025. We talked about wellness strategies  "that she is using such as: being mindful of nutrition and hydration, adequate sleep hygiene, and usage of screens/social media. As such she reports wanting to make a goal of getting off social media for the month of January to allow for greater clarity and possible creativity. She reports feeling comfortable where she is relative to her relationships. We talked about her relationship with her mom in particular and how she has purposely chosen a path of independence in part due to her mom's anxiety and resistance to change.  No new symptoms or safety concerns.   During this session, this clinician used the following therapeutic modalities: Cognitive Processing Therapy    Substance Abuse was not addressed during this session. If the client is diagnosed with a co-occurring substance use disorder, please indicate any changes in the frequency or amount of use: NA. Stage of change for addressing substance use diagnoses: No substance use/Not applicable    ASSESSMENT:  Linda Galindo presents with a Euthymic/ normal mood.     her affect is Normal range and intensity, which is congruent, with her mood and the content of the session. The client has made progress on their goals.    During this session the client was oriented to person, place, and time. The client was engaged in the session. The client was able to sustain direct eye contact and was without psychomotor agitation. The client's thought processes were linear and clear.   Linda Galindo presents with a none risk of suicide, none risk of self-harm, and none risk of harm to others.    For any risk assessment that surpasses a \"low\" rating, a safety plan must be developed.    A safety plan was indicated: no  If yes, describe in detail NA    PLAN: Between sessions, Linda Galindo will take medication as prescribed and practice positive coping strategies as needed . At the next session, the therapist will use Cognitive Processing Therapy to address " stressors.    Behavioral Health Treatment Plan and Discharge Planning: Linda IVAN Galindo is aware of and agrees to continue to work on their treatment plan. They have identified and are working toward their discharge goals. yes    Depression Follow-up Plan Completed: Not applicable    Visit start and stop times:    01/06/25  Start Time: 1300  Stop Time: 1350  Total Visit Time: 50 minutes

## 2025-01-17 ENCOUNTER — TELEPHONE (OUTPATIENT)
Age: 55
End: 2025-01-17

## 2025-01-17 NOTE — TELEPHONE ENCOUNTER
"    [facility]  DISCHARGE SUMMARY       Date of Admission: 9/9/2019  Date of Discharge:  9/13/2019  Primary Care Physician: Vanesa Correa DO  Attending Physician: Pablito Cade MD  Consulting Physicians: Dr. Nito Lafleur; Dr. Nicholas Hanna       Discharge Diagnoses:  1.  Coronary artery disease:   a.  Coronary artery bypass grafting, 1990.  b. Left heart catheterization 2010, at [facility], ID[facility].  Nonobstructive.    c. Increased  shortness of air, summer 2015.   d. [facility] 11/2015- patent LIMA to LAD and SVG to RCA. No myocardium in jeopardy. Normal LVEF   e. History of statin \"intolerances\".  f. [facility] [place] 9/9/19: 2/2 patent grafts, high degree stenosis in proximal ramus intermedians, treated with PTCA and ZES  1. Post-procedure hematoma with no pseudoaneurysm by duplex  g. Echo 9/9/19: EF 80%, mild-mod LVH  2. Hypertension.   3. Paroxysmal atrial fibrillation, Winter 2017.  a. Chads-vasc=5, NOAC instituted by PCP.  4. Dyslipidemia.   5. Polymyositis.   6. Dependent edema.    7. CKD.   8. Chronic anemia, secondary to (CKD).   9. Osteopenia.    10. GERD.   11. History of DVT in 2009.   12. Cognitive impairment with acute delirium during ICU admission for hematoma 09/2019  13. Chronic microaspiration, per Dr. Lafleur        Hospital Course:   Patient is an 85 y.o. female who presented for elective cardiac catheterization. She was seen in the office recently with symptoms strongly suggestive of angina decubitus. She underwent catheterization on 9/9/19 with RM to proximal ramus intermedians. Following procedure, she developed a large right groin hematoma and was admitted to the ICU for close monitoring. She underwent repeat angiogram for assessment of her femoral artery and this was found to be widely patent with no \"leak.\"  Unfortunately, patient developed acute confusion and delirium in the ICU. Dr. Hanna saw the patient and this was thought to be due to sleep " Patient is calling regarding cancelling an appointment.    Date/Time: 1/20/25 at 1pm    Reason: patient's work schedule changed due to the inclement weather coming    Patient was rescheduled: YES [] NO [x]  If yes, when was Patient reschedule for: n/a    Patient requesting call back to reschedule: YES [] NO [x]   deprivation, however patient was known to have some mild cognitive impairment with possible sundowning prior to admission. She improved tremendously with Melatonin and Seroquel and was felt stable for discharge to home with family and HH on 9/13/19. Her right groin remained stable and duplex did not demonstrate a pseudoaneurysm. She was ambulatory and felt ready for discharge on 9/13/19 with close OP Neurology follow up.     Procedures Performed  Procedure(s):  Left Heart Cath 9/9/19:  Final Impression: #1: The left ventricular end-diastolic pressure is 15 mmHg.                             #2:  Patency of 2 of 2 bypass grafts (see above).                             #3:  Very high degree stenosis in the proximal ramus intermediate ends artery that is associated with an abnormal RFR.  This was treated with PTCA and the placement of a zotarolimus eluding stent at this Sitting.          Pertinent Test Results:   Right groin duplex 9/11/19:  Interpretation Summary     · No evidence of right groin pseudoaneurysm or AV fistula.  · Hematoma is noted without any evidence of abnormal flow.     Echo 9/9/19:  Interpretation Summary     · The right atrium is mildly dilated.  · Global and segmental LV systolic function is normal with a calculated left ventricular ejection fraction of 80%.  · There is a trivial gradient across a trileaflet somewhat calcified aortic valve. Important aortic stenosis is not seen. Aortic insufficiency is not seen.  · There is calcium in the mitral annulus and posterior mitral valve leaflet. The valve appears to be functionally normal.  · The estimated right ventricular systolic pressure is normal.  · There is mild-moderate concentric left ventricular hypertrophy.  · No other important findings are noted on this study.        Lab Results   Component Value Date    WBC 8.94 09/13/2019    HGB 11.9 (L) 09/13/2019    HCT 40.5 09/13/2019    MCV 89.6 09/13/2019     09/13/2019     Lab Results  "  Component Value Date    GLUCOSE 141 (H) 09/11/2019    BUN 24 (H) 09/11/2019    CREATININE 1.40 (H) 09/11/2019    EGFRIFNONA 36 (L) 09/11/2019    BCR 17.1 09/11/2019    K 4.2 09/12/2019    CO2 22.0 09/11/2019    CALCIUM 8.7 09/11/2019    ALBUMIN 3.70 09/09/2019    AST 26 09/09/2019    ALT 17 09/09/2019     Lab Results   Component Value Date    HGBA1C 6.50 (H) 09/09/2019     Lab Results   Component Value Date    CHOL 218 (H) 09/09/2019    CHLPL 213 (H) 11/04/2015    TRIG 140 09/09/2019    HDL 64 (H) 09/09/2019     (H) 09/09/2019     Lab Results   Component Value Date    TSH 1.750 07/18/2019       Physical Exam on Discharge:/53 (BP Location: Left arm, Patient Position: Sitting)   Pulse 64   Temp 98.5 °F (36.9 °C) (Axillary)   Resp 18   Ht 165.1 cm (65\")   Wt 87.6 kg (193 lb 2 oz)   LMP  (LMP Unknown)   SpO2 97%   BMI 32.14 kg/m²     General Appearance No acute distress   Neck No adenopathy, supple, trachea midline, no JVD   Lungs Clear to auscultation,respirations regular, even and unlabored   Heart Regular rhythm and normal rate, normal S1 and S2, 1/6 systolic murmur, no gallop, no rub, no click   Chest wall No abnormalities observed   Abdomen Normal bowel sounds, no masses, no hepatomegaly, soft   Extremities Moves all extremities well. Right groin ecchymosis, no bleeding. No LE edema   Neurological Alert and oriented x 3     Discharge Medications     Discharge Medications      New Medications      Instructions Start Date   clopidogrel 75 MG tablet  Commonly known as:  PLAVIX   75 mg, Oral, Daily      donepezil 5 MG tablet  Commonly known as:  ARICEPT   5 mg, Oral, Nightly      pitavastatin calcium 2 MG tablet tablet  Commonly known as:  LIVALO   2 mg, Oral, Nightly         Continue These Medications      Instructions Start Date   amLODIPine 10 MG tablet  Commonly known as:  NORVASC   10 mg, Oral, Daily      atenolol 100 MG tablet  Commonly known as:  TENORMIN   TAKE 1 TABLET BY MOUTH ONCE " DAILY      cholecalciferol 1000 units tablet  Commonly known as:  VITAMIN D3   1,000 Units, Oral, Daily      folic acid 1 MG tablet  Commonly known as:  FOLVITE   1 mg, Oral, Daily      furosemide 20 MG tablet  Commonly known as:  LASIX   20 mg, Oral, Every Other Day, Since august 7th (she had this at home)      levothyroxine 88 MCG tablet  Commonly known as:  SYNTHROID, LEVOTHROID   88 mcg, Oral, Daily      potassium chloride 20 MEQ CR tablet  Commonly known as:  K-DUR,KLOR-CON   20 mEq, Oral, Every Other Day, With LAsix      predniSONE 5 MG tablet  Commonly known as:  DELTASONE   2 Times Daily PRN      raNITIdine 150 MG tablet  Commonly known as:  ZANTAC   150 mg, Oral, 2 Times Daily      XARELTO 20 MG tablet  Generic drug:  rivaroxaban   TAKE 1 TABLET BY MOUTH ONCE DAILY         Stop These Medications    aspirin 81 MG EC tablet          Discharge Diet:   Diet Instructions     Resume regular diet                  Activity at Discharge:   Activity Instructions     Resume regular activity.     Home health will call to set up visit               Condition on Discharge: stable    Follow-up Appointments:  Future Appointments   Date Time Provider Department Center   10/2/2019  2:00 PM Marie Oneill PA-C [facility] None   10/14/2019  9:30 AM Vanesa Correa DO [facility] None   12/17/2019  3:45 PM Pablito Rodriguez MD [facility] None     Additional Instructions for the Follow-ups that You Need to Schedule     Basic Metabolic Panel    Sep 11, 2019 (Approximate)      Please fax to DR. RODRIGUEZ office.    Order Comments:  Please fax to DR. RODRIGUEZ office.          Ambulatory Referral to Home Health   As directed      Face to Face Visit Date:  9/12/2019    Follow-up provider for Plan of Care?:  I treated the patient in an acute care facility and will not continue treatment after discharge.    Follow-up provider:  VANESA CORREA [[id number]]    Reason/Clinical Findings:  home PT    Describe mobility limitations that make  leaving home difficult:  s/p hospitalization    Nursing/Therapeutic Services Requested:  Physical Therapy Occupational Therapy    Frequency:  1 Week 1         Discharge Follow-up with Specified Provider: Neurology- Dr. Dillon Triplett; 2 Weeks   As directed      To:  Neurology- Dr. Dillon Triplett    Follow Up:  2 Weeks               Scribed for Pablito Cade MD by Omayra Chen PA-C. 9/30/2019  7:55 AM    Time: Discharge >30 min

## 2025-01-27 ENCOUNTER — OFFICE VISIT (OUTPATIENT)
Dept: OBGYN CLINIC | Facility: CLINIC | Age: 55
End: 2025-01-27
Payer: COMMERCIAL

## 2025-01-27 VITALS — WEIGHT: 168.4 LBS | BODY MASS INDEX: 28.91 KG/M2 | DIASTOLIC BLOOD PRESSURE: 70 MMHG | SYSTOLIC BLOOD PRESSURE: 110 MMHG

## 2025-01-27 DIAGNOSIS — Z79.890 ENCOUNTER FOR MONITORING POSTMENOPAUSAL ESTROGEN REPLACEMENT THERAPY: Primary | ICD-10-CM

## 2025-01-27 DIAGNOSIS — N95.1 VASOMOTOR SYMPTOMS DUE TO MENOPAUSE: ICD-10-CM

## 2025-01-27 DIAGNOSIS — Z51.81 ENCOUNTER FOR MONITORING POSTMENOPAUSAL ESTROGEN REPLACEMENT THERAPY: Primary | ICD-10-CM

## 2025-01-27 PROCEDURE — 99213 OFFICE O/P EST LOW 20 MIN: CPT | Performed by: OBSTETRICS & GYNECOLOGY

## 2025-01-27 RX ORDER — TIRZEPATIDE 5 MG/.5ML
5 INJECTION, SOLUTION SUBCUTANEOUS WEEKLY
COMMUNITY
Start: 2024-11-20

## 2025-01-27 RX ORDER — TIRZEPATIDE 2.5 MG/.5ML
INJECTION, SOLUTION SUBCUTANEOUS
COMMUNITY
Start: 2024-12-07

## 2025-01-27 RX ORDER — ESTRADIOL 1 MG/1
1 TABLET ORAL DAILY
Qty: 90 TABLET | Refills: 3 | Status: SHIPPED | OUTPATIENT
Start: 2025-01-27

## 2025-01-27 NOTE — PATIENT INSTRUCTIONS
Refills were sent for your Estrace to your pharmacy.  Please make an appointment to follow-up for your annual visit in the spring.  If you have any questions or concerns in the interim, please contact our office

## 2025-01-27 NOTE — PROGRESS NOTES
Assessment/Plan:      Diagnoses and all orders for this visit:    Encounter for monitoring postmenopausal estrogen replacement therapy    Vasomotor symptoms due to menopause  -     estradiol (Estrace) 1 mg tablet; Take 1 tablet (1 mg total) by mouth daily    Other orders  -     Tirzepatide (Mounjaro) 2.5 MG/0.5ML SOAJ  -     tirzepatide (Zepbound) 5 mg/0.5 mL auto-injector; Inject 5 mg under the skin Once a week          Patient is overall satisfied with the Estrace regimen and would like to continue.  Refill sent to the pharmacy today.  Encourage patient to schedule annual visit appointment for the spring.  All questions and concerns answered    Subjective:     Patient ID: Linda Galindo is a 55 y.o. female who presents for 6-month follow-up for Estrace therapy.  She was previously on Estrace 0.05 mg daily, but was seen in August 2024 and at this time her dose was increased to 0.1 mg daily.  She feels that this is significantly helped her hot flashes and overall symptoms.  She is happy with the medication and would like to continue.  She notes she was recently put on Prozac and feels that this has helped her.      Total time of today's visit was 20 minutes of which greater than 50% was spent face-to-face counseling the patient as well as coordination of care, review of chart and lab values, physical examination as well as computer entry into the [employer] medical record system.      HPI    Review of Systems   Constitutional: Negative.  Negative for appetite change, diaphoresis, fatigue, fever and unexpected weight change.   HENT: Negative.     Eyes: Negative.    Respiratory: Negative.     Cardiovascular: Negative.    Gastrointestinal: Negative.  Negative for abdominal pain, blood in stool, constipation, diarrhea, nausea and vomiting.   Endocrine: Negative.  Negative for cold intolerance and heat intolerance.   Genitourinary: Negative.  Negative for dysuria, frequency, hematuria, urgency, vaginal bleeding, vaginal  discharge and vaginal pain.   Musculoskeletal: Negative.    Skin: Negative.    Allergic/Immunologic: Negative.    Neurological: Negative.    Hematological: Negative.  Negative for adenopathy.   Psychiatric/Behavioral: Negative.           Objective:     Physical Exam  Constitutional:       Appearance: She is well-developed.   HENT:      Head: Normocephalic.   Eyes:      Pupils: Pupils are equal, round, and reactive to light.   Cardiovascular:      Rate and Rhythm: Normal rate.   Pulmonary:      Effort: Pulmonary effort is normal.   Musculoskeletal:         General: Normal range of motion.      Cervical back: Normal range of motion and neck supple.   Skin:     General: Skin is warm and dry.   Neurological:      General: No focal deficit present.      Mental Status: She is alert and oriented to person, place, and time.   Psychiatric:         Mood and Affect: Mood normal.         Behavior: Behavior normal.         Thought Content: Thought content normal.         Judgment: Judgment normal.

## 2025-02-03 ENCOUNTER — SOCIAL WORK (OUTPATIENT)
Dept: BEHAVIORAL/MENTAL HEALTH CLINIC | Facility: CLINIC | Age: 55
End: 2025-02-03
Payer: COMMERCIAL

## 2025-02-03 DIAGNOSIS — F33.1 MAJOR DEPRESSIVE DISORDER, RECURRENT EPISODE, MODERATE (HCC): Primary | ICD-10-CM

## 2025-02-03 PROCEDURE — 90834 PSYTX W PT 45 MINUTES: CPT | Performed by: SOCIAL WORKER

## 2025-02-03 NOTE — PSYCH
"Behavioral Health Psychotherapy Progress Note    Psychotherapy Provided: Individual Psychotherapy     1. Major depressive disorder, recurrent episode, moderate (HCC)            Goals addressed in session: Goal 1     DATA: Linda reports feeling well stating that she is experiencing mild depressive symptoms here and there but not often. Discussed stressors within her family as well as in the workplace. Identified theme of feeling worn down by other people's emotions and acknowledged feeling this way since she was a young child. Honored empath tendencies and developed mantra to care for herself and to inquire within with curiosity rather than judgement. Continues to take medication daily and is benefiting from therapy as per her report. No safety concerns.   During this session, this clinician used the following therapeutic modalities: Cognitive Behavioral Therapy, Cognitive Processing Therapy, Mindfulness-based Strategies, and Supportive Psychotherapy    Substance Abuse was not addressed during this session. If the client is diagnosed with a co-occurring substance use disorder, please indicate any changes in the frequency or amount of use: NA. Stage of change for addressing substance use diagnoses: No substance use/Not applicable    ASSESSMENT:  Linda Galindo presents with a Euthymic/ normal mood.     her affect is Normal range and intensity, which is congruent, with her mood and the content of the session. The client has made progress on their goals.    During this session the client was oriented to person, place, and time. The client was engaged in the session. The client was able to sustain direct eye contact and was without psychomotor agitation. The client's thought processes were linear and clear.   Linda Galindo presents with a none risk of suicide, none risk of self-harm, and none risk of harm to others.    For any risk assessment that surpasses a \"low\" rating, a safety plan must be developed.    A safety " plan was indicated: no  If yes, describe in detail NA    PLAN: Between sessions, Linda Galindo will take medication as prescribed and practice positive coping strategies as needed . At the next session, the therapist will use Cognitive Behavioral Therapy, Cognitive Processing Therapy, Mindfulness-based Strategies, and Supportive Psychotherapy to address stressors.    Behavioral Health Treatment Plan and Discharge Planning: Linda Galindo is aware of and agrees to continue to work on their treatment plan. They have identified and are working toward their discharge goals. yes    Depression Follow-up Plan Completed: Not applicable    Visit start and stop times:    02/03/25  Start Time: 1300  Stop Time: 1350  Total Visit Time: 50 minutes

## 2025-03-03 ENCOUNTER — SOCIAL WORK (OUTPATIENT)
Dept: BEHAVIORAL/MENTAL HEALTH CLINIC | Facility: CLINIC | Age: 55
End: 2025-03-03
Payer: COMMERCIAL

## 2025-03-03 DIAGNOSIS — F33.1 MAJOR DEPRESSIVE DISORDER, RECURRENT EPISODE, MODERATE (HCC): Primary | ICD-10-CM

## 2025-03-03 PROCEDURE — 90834 PSYTX W PT 45 MINUTES: CPT | Performed by: SOCIAL WORKER

## 2025-03-03 NOTE — PSYCH
Behavioral Health Psychotherapy Progress Note    Psychotherapy Provided: Individual Psychotherapy     1. Major depressive disorder, recurrent episode, moderate (HCC)            Goals addressed in session: Goal 1     DATA: Linda reports feeling ok stating that she is concerned that her Prozac is not working as well as first expected. Examined current mood and persistant feelings of low energy which she also discussed could be explained by sleep disruptions due to pain in her shoulder. She will talk to her PCP about this during her next wellness exam. Discussed current stress with global politics and how she absorbs the energy around her. Talked about practical ways to address this, whether that be through turning off news notifications or directly approaching her family when needed. Acknowledged way in which she sometimes buries her feelings with the concern that her family cannot handle it. Related this to her dating experiences as well. Will watch [place] to help further conceptualize how family dynamics get started and the role that we play in family systems gets established.   During this session, this clinician used the following therapeutic modalities: Client-centered Therapy    Substance Abuse was not addressed during this session. If the client is diagnosed with a co-occurring substance use disorder, please indicate any changes in the frequency or amount of use: NA. Stage of change for addressing substance use diagnoses: No substance use/Not applicable    ASSESSMENT:  Linda Galindo presents with a Euthymic/ normal mood.     her affect is Normal range and intensity, which is congruent, with her mood and the content of the session. The client has made progress on their goals.    During this session the client was oriented to person, place, and time. The client was engaged in the session. The client was able to sustain direct eye contact and was without psychomotor agitation. The client's thought processes were  "linear and clear.   Linda Galindo presents with a none risk of suicide, none risk of self-harm, and none risk of harm to others.    For any risk assessment that surpasses a \"low\" rating, a safety plan must be developed.    A safety plan was indicated: no  If yes, describe in detail NA    PLAN: Between sessions, Linda Galindo will take medication as prescribed and practice positive coping strategies as needed . At the next session, the therapist will use Client-centered Therapy to address stressors.    Behavioral Health Treatment Plan and Discharge Planning: Linda Galindo is aware of and agrees to continue to work on their treatment plan. They have identified and are working toward their discharge goals. yes    Depression Follow-up Plan Completed: Not applicable    Visit start and stop times:    03/03/25  Start Time: 1300  Stop Time: 1350  Total Visit Time: 50 minutes  "

## 2025-03-17 ENCOUNTER — SOCIAL WORK (OUTPATIENT)
Dept: BEHAVIORAL/MENTAL HEALTH CLINIC | Facility: CLINIC | Age: 55
End: 2025-03-17
Payer: COMMERCIAL

## 2025-03-17 DIAGNOSIS — F33.1 MAJOR DEPRESSIVE DISORDER, RECURRENT EPISODE, MODERATE (HCC): Primary | ICD-10-CM

## 2025-03-17 PROCEDURE — 90834 PSYTX W PT 45 MINUTES: CPT | Performed by: SOCIAL WORKER

## 2025-03-19 NOTE — PSYCH
Behavioral Health Psychotherapy Progress Note    Psychotherapy Provided: Individual Psychotherapy     1. Major depressive disorder, recurrent episode, moderate (HCC)            Goals addressed in session: Goal 1     DATA: Linda reports feeling ok overall. Acknowledged needing a lot of rest this past weekend resulting in some planned down time, which she struggled to accept in the moment. Explored the environment of her childhood home and how in many ways she absorbed the family tension mainly projected by her mother who she feels was emotionally stunted due to her mother dying at an unexpectedly young age. Writer assisted in helping Linda conceptualize the many parts within her and how she can simultaneously feel both understanding and disgust towards her mother. Worked on discerning when and how to create space for her emotion and ways to self soothe beginning with leaning in with curiousty rather than judgement. She was receptive to feedback and engaged in the session. No acute symptoms. No safety concerns. Return in 2 weeks.   During this session, this clinician used the following therapeutic modalities: Client-centered Therapy    Substance Abuse was not addressed during this session. If the client is diagnosed with a co-occurring substance use disorder, please indicate any changes in the frequency or amount of use: NA. Stage of change for addressing substance use diagnoses: No substance use/Not applicable    ASSESSMENT:  Linda Galindo presents with a Euthymic/ normal mood.     her affect is Normal range and intensity, which is congruent, with her mood and the content of the session. The client has made progress on their goals.    During this session the client was oriented to person, place, and time. The client was engaged in the session. The client was able to sustain direct eye contact and was without psychomotor agitation. The client's thought processes were linear and clear.   Linda Galindo presents with a  "none risk of suicide, none risk of self-harm, and none risk of harm to others.    For any risk assessment that surpasses a \"low\" rating, a safety plan must be developed.    A safety plan was indicated: no  If yes, describe in detail NA    PLAN: Between sessions, Linda Galindo will take medication as prescribed and practice positive coping strategies as needed . At the next session, the therapist will use Client-centered Therapy to address stressors.    Behavioral Health Treatment Plan and Discharge Planning: Linda Galindo is aware of and agrees to continue to work on their treatment plan. They have identified and are working toward their discharge goals. yes    Depression Follow-up Plan Completed: Not applicable    Visit start and stop times:    03/18/25  Start Time: 1300  Stop Time: 1350  Total Visit Time: 50 minutes  "

## 2025-03-31 ENCOUNTER — SOCIAL WORK (OUTPATIENT)
Dept: BEHAVIORAL/MENTAL HEALTH CLINIC | Facility: CLINIC | Age: 55
End: 2025-03-31
Payer: COMMERCIAL

## 2025-03-31 DIAGNOSIS — F33.1 MAJOR DEPRESSIVE DISORDER, RECURRENT EPISODE, MODERATE (HCC): Primary | ICD-10-CM

## 2025-03-31 PROCEDURE — 90834 PSYTX W PT 45 MINUTES: CPT | Performed by: SOCIAL WORKER

## 2025-03-31 NOTE — PSYCH
"Behavioral Health Psychotherapy Progress Note    Psychotherapy Provided: Individual Psychotherapy     1. Major depressive disorder, recurrent episode, moderate (HCC)            Goals addressed in session: Goal 1     DATA: Linda presented to this session stating that she has been well. Discussed recent work stressors as well as current political climate. Reviewed mindfulness skills as a way to reconnect with the here and now and what she can control in the moment. Encouraged that she try to consume less news as well which she was receptive to. Meds are working well. Feels supported by the increase in Prozac. No new stressors or safety concerns. Return in 2 weeks.   During this session, this clinician used the following therapeutic modalities: Client-centered Therapy, Mindfulness-based Strategies, and Supportive Psychotherapy    Substance Abuse was not addressed during this session. If the client is diagnosed with a co-occurring substance use disorder, please indicate any changes in the frequency or amount of use: NA. Stage of change for addressing substance use diagnoses: No substance use/Not applicable    ASSESSMENT:  Linda Galindo presents with a Euthymic/ normal mood.     her affect is Normal range and intensity, which is congruent, with her mood and the content of the session. The client has made progress on their goals.    During this session the client was oriented to person, place, and time. The client was engaged in the session. The client was able to sustain direct eye contact and was without psychomotor agitation. The client's thought processes were linear and clear.   Linda Galindo presents with a none risk of suicide, none risk of self-harm, and none risk of harm to others.    For any risk assessment that surpasses a \"low\" rating, a safety plan must be developed.    A safety plan was indicated: no  If yes, describe in detail NA    PLAN: Between sessions, Linda Galindo will take medication as " prescribed and practice positive coping strategies as needed . At the next session, the therapist will use Client-centered Therapy, Mindfulness-based Strategies, and Supportive Psychotherapy to address stressors.    Behavioral Health Treatment Plan and Discharge Planning: Linda Galindo is aware of and agrees to continue to work on their treatment plan. They have identified and are working toward their discharge goals. yes    Depression Follow-up Plan Completed: No    Visit start and stop times:    03/31/25  Start Time: 1300  Stop Time: 1350  Total Visit Time: 50 minutes

## 2025-04-07 ENCOUNTER — EVALUATION (OUTPATIENT)
Dept: PHYSICAL THERAPY | Facility: REHABILITATION | Age: 55
End: 2025-04-07

## 2025-04-07 DIAGNOSIS — M25.511 CHRONIC RIGHT SHOULDER PAIN: Primary | ICD-10-CM

## 2025-04-07 DIAGNOSIS — G89.29 CHRONIC RIGHT SHOULDER PAIN: Primary | ICD-10-CM

## 2025-04-07 PROCEDURE — 97161 PT EVAL LOW COMPLEX 20 MIN: CPT | Performed by: PHYSICAL THERAPIST

## 2025-04-07 NOTE — PROGRESS NOTES
PT Evaluation     Today's date: 2025  Patient name: Linda Galindo  : 1970  MRN: [id number]  Referring provider: Moni Carney,*  Dx:   Encounter Diagnosis     ICD-10-CM    1. Chronic right shoulder pain  M25.511     G89.29           Start Time: 0700  Stop Time: 0740  Total time in clinic (min): 40 minutes    Assessment  Impairments: abnormal or restricted ROM, activity intolerance, impaired physical strength, lacks appropriate home exercise program, pain with function, weight-bearing intolerance and poor posture     Assessment details: Problem List:  1) R shoulder ER weakness  2) Pain with lifting  3) R UT weakness    Linda Galindo is a pleasant 55 y.o. female who presents with R shoulder pain that has been bothering her since Thanks.  she has decreased R shoulder strength especially into external rotation, decreased UT strength, and pain with lifting and sleeping resulting in the pain she is experiencing.  No further referral appears necessary at this time based upon examination results.  Patient will improve in 8 weeks with consistent HEP performance and session adherence. Patient would benefit from skilled physical therapy to decrease pain, improve function and help them achieve their goals.   Understanding of Dx/Px/POC: good     Prognosis: good    Goals  ST-4 weeks  Patient will be independent with home exercise program.   Patient will be able to manage symptoms independently.  Patient will decrease pain by 25-50%    LTG: by discharge  Patient will improve FOTO to goal  Patient will report minimal (1-2/10) pain with aggravating activities to display improvements in overall functional status  Patient will have pain free AROM  Patient will reported improved sleeping patterns      Plan  Patient would benefit from: skilled physical therapy  Planned modality interventions: cryotherapy, thermotherapy: hydrocollator packs and unattended electrical stimulation    Planned therapy  interventions: IADL retraining, joint mobilization, manual therapy, massage, ADL training, activity modification, abdominal trunk stabilization, ADL retraining, balance, balance/weight bearing training, neuromuscular re-education, body mechanics training, behavior modification, strengthening, stretching, therapeutic activities, therapeutic exercise, therapeutic training, transfer training, graded exercise, graded motor, home exercise program, graded activity, gait training, functional ROM exercises, patient education, postural training, IASTM, kinesiology taping and flexibility    Frequency: 2x week  Duration in weeks: 8  Plan of Care beginning date: 2025  Plan of Care expiration date: 2025  Treatment plan discussed with: patient        Subjective Evaluation    History of Present Illness  Mechanism of injury: Linda is a 55 y.o. female presenting to physical therapy on 25 with referral from MD for right shoulder pain that began around gi. Remembers waking up and right shoulder was really hurting. Has pain at rest and through the night, but can reach OH without issue. Uses a heating pad to help fall asleep. Denies numbness/tingling. Has hx of manipulation under anesthesia in both shoulders for frozen shoulder about a decade ago. Has some pain into her neck. Feels mostly like an ache. Feels some soreness through her upper chest. Denies pain with breathing or deep breaths. No pain with coughing or sneezing.           Quality of life: good    Patient Goals  Patient goals for therapy: increased strength, return to sport/leisure activities, independence with ADLs/IADLs, decreased pain and increased motion  Patient goal: decrease pain, improved sleeping, would like to be able to workout and lift weights  Pain  Current pain ratin  At best pain rating: 3  At worst pain ratin  Location: R shoulder, down R arm  Quality: dull ache  Relieving factors: heat  Aggravating factors: lifting and overhead  activity (sustained positions)  Progression: improved          Objective    Myotomes all intact b/l  Dermatome: all intact b/l        Reflexes:  all 2+ b/l           MMT         AROM          PROM    Shoulder       L       R        L           R      L     R   Flex.    WFL P at 90 WNL WNL   Abd.    WFL P at 90 WNL WNL   IR.    WFL P WNL WNL   ER.    WFL P WNL WNL            Rhomboid         Mid Trap         Low Trap         Serratus         Infraspnatus         Teres Major         Sub Scap             Rotator Cuff Testing:  ER Lag: negative   Drop Arm: negative   Painful Arc Sign: positive    Impingement Testing:   Terra: nt  Infraspinatus MMT: positive  Painful Arc Sign: positive    Thoracic Spine:            Segmental mobility: GHJ: normal         Precautions: standard    Manuals 4/7                                                                Neuro Re-Ed 4/7                                                                                                       Ther Ex 4/7            No money HEP            Cervical retraction + ext HEP            UT wall slide HEP                                                                             Ther Activity                                       Gait Training                                       Modalities

## 2025-04-07 NOTE — LETTER
2025    Moni Carney DO  [address] [id number]    Patient: Linda Galindo   YOB: 1970   Date of Visit: 2025     Encounter Diagnosis     ICD-10-CM    1. Chronic right shoulder pain  M25.511     G89.29           Dear Dr. Moni Carney, DO:    Thank you for your recent referral of Linda Galindo. Please review the attached evaluation summary from Linda's recent visit.     Please verify that you agree with the plan of care by signing the attached order.     If you have any questions or concerns, please do not hesitate to call.     I sincerely appreciate the opportunity to share in the care of one of your patients and hope to have another opportunity to work with you in the near future.       Sincerely,    Bhargav Hobson      Referring Provider:      I certify that I have read the below Plan of Care and certify the need for these services furnished under this plan of treatment while under my care.                    Moni Carney DO  [address] [address]  Via Fax: [phone]          PT Evaluation     Today's date: 2025  Patient name: Linda Galindo  : 1970  MRN: [id number]  Referring provider: Moni Carney,*  Dx:   Encounter Diagnosis     ICD-10-CM    1. Chronic right shoulder pain  M25.511     G89.29           Start Time: 0700  Stop Time: 0740  Total time in clinic (min): 40 minutes    Assessment  Impairments: abnormal or restricted ROM, activity intolerance, impaired physical strength, lacks appropriate home exercise program, pain with function, weight-bearing intolerance and poor posture     Assessment details: Problem List:  1) R shoulder ER weakness  2) Pain with lifting  3) R UT weakness    Linda Galindo is a pleasant 55 y.o. female who presents with R shoulder pain that has been bothering her since Thanksgiving.  she has decreased R shoulder strength especially into external rotation, decreased UT  strength, and pain with lifting and sleeping resulting in the pain she is experiencing.  No further referral appears necessary at this time based upon examination results.  Patient will improve in 8 weeks with consistent HEP performance and session adherence. Patient would benefit from skilled physical therapy to decrease pain, improve function and help them achieve their goals.   Understanding of Dx/Px/POC: good     Prognosis: good    Goals  ST-4 weeks  Patient will be independent with home exercise program.   Patient will be able to manage symptoms independently.  Patient will decrease pain by 25-50%    LTG: by discharge  Patient will improve FOTO to goal  Patient will report minimal (1-2/10) pain with aggravating activities to display improvements in overall functional status  Patient will have pain free AROM  Patient will reported improved sleeping patterns      Plan  Patient would benefit from: skilled physical therapy  Planned modality interventions: cryotherapy, thermotherapy: hydrocollator packs and unattended electrical stimulation    Planned therapy interventions: IADL retraining, joint mobilization, manual therapy, massage, ADL training, activity modification, abdominal trunk stabilization, ADL retraining, balance, balance/weight bearing training, neuromuscular re-education, body mechanics training, behavior modification, strengthening, stretching, therapeutic activities, therapeutic exercise, therapeutic training, transfer training, graded exercise, graded motor, home exercise program, graded activity, gait training, functional ROM exercises, patient education, postural training, IASTM, kinesiology taping and flexibility    Frequency: 2x week  Duration in weeks: 8  Plan of Care beginning date: 2025  Plan of Care expiration date: 2025  Treatment plan discussed with: patient        Subjective Evaluation    History of Present Illness  Mechanism of injury: Linda is a 55 y.o. female presenting to  physical therapy on 25 with referral from MD for right shoulder pain that began around . Remembers waking up and right shoulder was really hurting. Has pain at rest and through the night, but can reach OH without issue. Uses a heating pad to help fall asleep. Denies numbness/tingling. Has hx of manipulation under anesthesia in both shoulders for frozen shoulder about a decade ago. Has some pain into her neck. Feels mostly like an ache. Feels some soreness through her upper chest. Denies pain with breathing or deep breaths. No pain with coughing or sneezing.           Quality of life: good    Patient Goals  Patient goals for therapy: increased strength, return to sport/leisure activities, independence with ADLs/IADLs, decreased pain and increased motion  Patient goal: decrease pain, improved sleeping, would like to be able to workout and lift weights  Pain  Current pain ratin  At best pain rating: 3  At worst pain ratin  Location: R shoulder, down R arm  Quality: dull ache  Relieving factors: heat  Aggravating factors: lifting and overhead activity (sustained positions)  Progression: improved          Objective    Myotomes all intact b/l  Dermatome: all intact b/l        Reflexes:  all 2+ b/l           MMT         AROM          PROM    Shoulder       L       R        L           R      L     R   Flex.    WFL P at 90 WNL WNL   Abd.    WFL P at 90 WNL WNL   IR.    WFL P WNL WNL   ER.    WFL P WNL WNL            Rhomboid         Mid Trap         Low Trap         Serratus         Infraspnatus         Teres Major         Sub Scap             Rotator Cuff Testing:  ER Lag: negative   Drop Arm: negative   Painful Arc Sign: positive    Impingement Testing:   Terra: nt  Infraspinatus MMT: positive  Painful Arc Sign: positive    Thoracic Spine:            Segmental mobility: GHJ: normal         Precautions: standard    Manuals                                                                  Neuro Re-Ed 4/7                                                                                                       Ther Ex 4/7            No money HEP            Cervical retraction + ext HEP            UT wall slide HEP                                                                             Ther Activity                                       Gait Training                                       Modalities

## 2025-04-14 ENCOUNTER — SOCIAL WORK (OUTPATIENT)
Dept: BEHAVIORAL/MENTAL HEALTH CLINIC | Facility: CLINIC | Age: 55
End: 2025-04-14
Payer: COMMERCIAL

## 2025-04-14 DIAGNOSIS — F33.1 MAJOR DEPRESSIVE DISORDER, RECURRENT EPISODE, MODERATE (HCC): Primary | ICD-10-CM

## 2025-04-14 PROCEDURE — 90834 PSYTX W PT 45 MINUTES: CPT | Performed by: SOCIAL WORKER

## 2025-04-14 NOTE — PSYCH
Behavioral Health Psychotherapy Progress Note    Psychotherapy Provided: Individual Psychotherapy     1. Major depressive disorder, recurrent episode, moderate (HCC)            Goals addressed in session: Goal 1     DATA: Linda reports feeling well overall. Discussed recent stressor of her friend being laid off and how that is triggering an emotional memory of being laid off herself once. Acknowledged ways that she has previously connect with highly emotional times such as being laid off or ending a marriage, and uncovered patterns of distance and avoidance, turned hyper independence in terms of behaviors. Related this to how she argues with her mom and how she is constantly trying to find her solutions. Replayed a recent conversation wherein Linda jumped in to feeling angry, inpatient, and then quick to minimize her mom's feelings. Acknowledged how this feels for her and suggested that she simply listen only, which she said that she understood. In acknowledging the ways that she exerts control to avoid feelings, she agreed to be more mindful of this and will work on acceptance. No safety concerns. Return in 2 weeks.   During this session, this clinician used the following therapeutic modalities: Client-centered Therapy    Substance Abuse was not addressed during this session. If the client is diagnosed with a co-occurring substance use disorder, please indicate any changes in the frequency or amount of use: NA. Stage of change for addressing substance use diagnoses: No substance use/Not applicable    ASSESSMENT:  Linda Galindo presents with a Euthymic/ normal mood.     her affect is Normal range and intensity, which is congruent, with her mood and the content of the session. The client has made progress on their goals.    During this session the client was oriented to person, place, and time. The client was engaged in the session. The client was able to sustain direct eye contact and was without psychomotor  "agitation. The client's thought processes were linear and clear.   Linda Galindo presents with a none risk of suicide, none risk of self-harm, and none risk of harm to others.    For any risk assessment that surpasses a \"low\" rating, a safety plan must be developed.    A safety plan was indicated: no  If yes, describe in detail NA    PLAN: Between sessions, Linda Galindo will take medication as prescribed and practice positive coping strategies as needed . At the next session, the therapist will use Client-centered Therapy to address stressors.    Behavioral Health Treatment Plan and Discharge Planning: Linda Galindo is aware of and agrees to continue to work on their treatment plan. They have identified and are working toward their discharge goals. yes    Depression Follow-up Plan Completed: No    Visit start and stop times:    04/14/25  Start Time: 1300  Stop Time: 1350  Total Visit Time: 50 minutes  "

## 2025-04-23 ENCOUNTER — APPOINTMENT (OUTPATIENT)
Dept: PHYSICAL THERAPY | Facility: REHABILITATION | Age: 55
End: 2025-04-23

## 2025-04-28 ENCOUNTER — SOCIAL WORK (OUTPATIENT)
Dept: BEHAVIORAL/MENTAL HEALTH CLINIC | Facility: CLINIC | Age: 55
End: 2025-04-28
Payer: COMMERCIAL

## 2025-04-28 DIAGNOSIS — F33.1 MAJOR DEPRESSIVE DISORDER, RECURRENT EPISODE, MODERATE (HCC): Primary | ICD-10-CM

## 2025-04-28 PROCEDURE — 90834 PSYTX W PT 45 MINUTES: CPT | Performed by: SOCIAL WORKER

## 2025-04-29 ENCOUNTER — ANNUAL EXAM (OUTPATIENT)
Dept: OBGYN CLINIC | Facility: CLINIC | Age: 55
End: 2025-04-29
Payer: COMMERCIAL

## 2025-04-29 VITALS
BODY MASS INDEX: 26.94 KG/M2 | SYSTOLIC BLOOD PRESSURE: 110 MMHG | WEIGHT: 157.8 LBS | HEIGHT: 64 IN | DIASTOLIC BLOOD PRESSURE: 68 MMHG

## 2025-04-29 DIAGNOSIS — Z12.31 ENCOUNTER FOR SCREENING MAMMOGRAM FOR MALIGNANT NEOPLASM OF BREAST: ICD-10-CM

## 2025-04-29 DIAGNOSIS — N95.2 ATROPHIC VAGINITIS: ICD-10-CM

## 2025-04-29 DIAGNOSIS — Z01.419 ENCOUNTER FOR GYNECOLOGICAL EXAMINATION WITHOUT ABNORMAL FINDING: Primary | ICD-10-CM

## 2025-04-29 PROCEDURE — S0612 ANNUAL GYNECOLOGICAL EXAMINA: HCPCS | Performed by: OBSTETRICS & GYNECOLOGY

## 2025-04-29 NOTE — PSYCH
"Behavioral Health Psychotherapy Progress Note    Psychotherapy Provided: Individual Psychotherapy     1. Major depressive disorder, recurrent episode, moderate (HCC)            Goals addressed in session: Goal 1     DATA: Linda reports feeling well overall. Has been making efforts to be less reactive towards her mom and more empathetic to her struggles and protective of their bond. Discussed ways that she has been aware of holding space for both of their pain. Has been looking forward to her trip in the fall and is beginning to make plans for herself. Talked about her evolving relationship with her self in retrospect of what she has gone through and experienced in both marriages. No safety concerns. Return in 2 weeks.   During this session, this clinician used the following therapeutic modalities: Client-centered Therapy    Substance Abuse was not addressed during this session. If the client is diagnosed with a co-occurring substance use disorder, please indicate any changes in the frequency or amount of use: NA. Stage of change for addressing substance use diagnoses: No substance use/Not applicable    ASSESSMENT:  Linda Galindo presents with a Euthymic/ normal mood.     her affect is Normal range and intensity, which is congruent, with her mood and the content of the session. The client has made progress on their goals.    During this session the client was oriented to person, place, and time. The client was engaged in the session. The client was able to sustain direct eye contact and was without psychomotor agitation. The client's thought processes were linear and clear.   Linda Galindo presents with a none risk of suicide, none risk of self-harm, and none risk of harm to others.    For any risk assessment that surpasses a \"low\" rating, a safety plan must be developed.    A safety plan was indicated: no  If yes, describe in detail NA    PLAN: Between sessions, Linda Galindo will take medication as " prescribed and practice positive coping strategies as needed . At the next session, the therapist will use Client-centered Therapy to address stressors.    Behavioral Health Treatment Plan and Discharge Planning: Linda Galindo is aware of and agrees to continue to work on their treatment plan. They have identified and are working toward their discharge goals. yes    Depression Follow-up Plan Completed: No    Visit start and stop times:    04/29/25  Start Time: 1300  Stop Time: 1350  Total Visit Time: 50 minutes

## 2025-04-29 NOTE — PROGRESS NOTES
Assessment/Plan:    No problem-specific Assessment & Plan notes found for this encounter.       Diagnoses and all orders for this visit:    Encounter for gynecological examination without abnormal finding    Encounter for screening mammogram for malignant neoplasm of breast    Atrophic vaginitis          Normal gynecological physical examination.  Self-breast examination stressed.  Mammogram ordered.  Discussed regular exercise, healthy diet, importance of vitamin D and calcium supplements.  Discussed importance of sun block use during periods of prolonged sun exposure.  Patient will be seen in 1 year for routine gynecologic and medical examination.  Patient will call office for any problems, concerns, or issues which may arise during the interim.     Subjective:          HPI    Patient ID: Linda Galindo is a 55 y.o. female who presents today for her annual gynecologic and medical examination    Menstrual status: Status post hysterectomy.  Patient denies any abnormal bleeding.    Vasomotor symptoms: Denies, currently on estradiol 1 mg tablet daily.    Patient reports normal appetite    Patient reports normal bowel and bladder habits    Patient denies any significant pelvic or abdominal pain    Patient denies any headaches, chest pain, shortness of breath fever shakes or chills    Patient denies any COVID 19 symptoms including cough or loss of taste or smell    COVID vaccine status: Patient aware of COVID-vaccine protocol.    Medical problems: Depression, anxiety, hypercholesterolemia.    Colonoscopy status: Up-to-date, next colonoscopy due in 2031.    Mammogram status: Stressed importance of self breast examination patient up-to-date with mammogram at this time.    The following portions of the patient's history were reviewed and updated as appropriate: allergies, current medications, past family history, past medical history, past social history, past surgical history and problem list.    Review of Systems  "  Constitutional: Negative.  Negative for appetite change, diaphoresis, fatigue, fever and unexpected weight change.   HENT: Negative.     Eyes: Negative.    Respiratory: Negative.     Cardiovascular: Negative.         Following for hypercholesterolemia.   Gastrointestinal: Negative.  Negative for abdominal pain, blood in stool, constipation, diarrhea, nausea and vomiting.   Endocrine: Negative.  Negative for cold intolerance and heat intolerance.   Genitourinary: Negative.  Negative for dysuria, frequency, hematuria, urgency, vaginal bleeding, vaginal discharge and vaginal pain.   Musculoskeletal: Negative.    Skin: Negative.    Allergic/Immunologic: Negative.    Neurological: Negative.    Hematological: Negative.  Negative for adenopathy.   Psychiatric/Behavioral: Negative.          Following for depression and anxiety.         Objective:      /68   Ht 5' 4\" (1.626 m)   Wt 71.6 kg (157 lb 12.8 oz)   BMI 27.09 kg/m²          Physical Exam  Constitutional:       General: She is not in acute distress.     Appearance: Normal appearance. She is well-developed. She is not diaphoretic.   HENT:      Head: Normocephalic and atraumatic.   Eyes:      Pupils: Pupils are equal, round, and reactive to light.   Cardiovascular:      Rate and Rhythm: Normal rate and regular rhythm.      Heart sounds: Normal heart sounds. No murmur heard.     No friction rub. No gallop.   Pulmonary:      Effort: Pulmonary effort is normal.      Breath sounds: Normal breath sounds.   Chest:   Breasts:     Breasts are symmetrical.      Right: No inverted nipple, mass, nipple discharge, skin change or tenderness.      Left: No inverted nipple, mass, nipple discharge, skin change or tenderness.   Abdominal:      General: Bowel sounds are normal.      Palpations: Abdomen is soft.   Genitourinary:     General: Normal vulva.      Exam position: Supine.      Labia:         Right: No rash or lesion.         Left: No rash or lesion.       Urethra: " No urethral swelling or urethral lesion.      Vagina: Normal. No vaginal discharge, erythema, tenderness or bleeding.      Uterus: Absent.       Adnexa:         Right: No mass, tenderness or fullness.          Left: No mass, tenderness or fullness.        Rectum: Normal. Guaiac result negative.      Comments: Pelvic exam revealed mild atrophic vaginitis  Good pelvic support confirmed  Musculoskeletal:         General: Normal range of motion.      Cervical back: Normal range of motion and neck supple.   Lymphadenopathy:      Cervical: No cervical adenopathy.      Upper Body:      Right upper body: No supraclavicular adenopathy.      Left upper body: No supraclavicular adenopathy.   Skin:     General: Skin is warm and dry.      Findings: No rash.   Neurological:      General: No focal deficit present.      Mental Status: She is alert and oriented to person, place, and time.   Psychiatric:         Mood and Affect: Mood normal.         Speech: Speech normal.         Behavior: Behavior normal.         Thought Content: Thought content normal.         Judgment: Judgment normal.

## 2025-04-29 NOTE — PATIENT INSTRUCTIONS
Normal gynecological physical examination.  Self-breast examination stressed.  Mammogram ordered.  Discussed regular exercise, healthy diet, importance of vitamin D and calcium supplements.  Discussed importance of sun block use during periods of prolonged sun exposure.  Patient will be seen in 1 year for routine gynecologic and medical examination.  Patient will call office for any problems, concerns, or issues which may arise during the interim.  Will see patient in 6 months since she currently is on estrogen replacement therapy for breast exam

## 2025-05-12 ENCOUNTER — SOCIAL WORK (OUTPATIENT)
Dept: BEHAVIORAL/MENTAL HEALTH CLINIC | Facility: CLINIC | Age: 55
End: 2025-05-12
Payer: COMMERCIAL

## 2025-05-12 DIAGNOSIS — F33.1 MAJOR DEPRESSIVE DISORDER, RECURRENT EPISODE, MODERATE (HCC): Primary | ICD-10-CM

## 2025-05-12 PROCEDURE — 90834 PSYTX W PT 45 MINUTES: CPT | Performed by: SOCIAL WORKER

## 2025-05-14 NOTE — PSYCH
"Behavioral Health Psychotherapy Progress Note    Psychotherapy Provided: Individual Psychotherapy     1. Major depressive disorder, recurrent episode, moderate (HCC)            Goals addressed in session: Goal 1     DATA: Linda reports feeling well stating that she had a nice Mother's Day with her family. Suggested that she reinforce this by commenting something in the group chat which she said that she would. Worked through hesitancies to look for romantic relationships and to accept self as comfortable being alone as she does not feel lonely. Continues to plan for her trip this fall and is looking forward to engaging in solo travel more. No safety concerns. Is doing well and verbalizes marked change in her depressive symptoms.   During this session, this clinician used the following therapeutic modalities: Client-centered Therapy    Substance Abuse was not addressed during this session. If the client is diagnosed with a co-occurring substance use disorder, please indicate any changes in the frequency or amount of use: NA. Stage of change for addressing substance use diagnoses: No substance use/Not applicable    ASSESSMENT:  Linda Galindo presents with a Euthymic/ normal mood.     her affect is Normal range and intensity, which is congruent, with her mood and the content of the session. The client has made progress on their goals.    During this session the client was oriented to person, place, and time. The client was engaged in the session. The client was able to sustain direct eye contact and was without psychomotor agitation. The client's thought processes were linear and clear.   Linda Galindo presents with a none risk of suicide, none risk of self-harm, and none risk of harm to others.    For any risk assessment that surpasses a \"low\" rating, a safety plan must be developed.    A safety plan was indicated: no  If yes, describe in detail NA    PLAN: Between sessions, Linda Galindo will take medication " as prescribed and practice positive coping strategies as needed . At the next session, the therapist will use Client-centered Therapy to address stressors.    Behavioral Health Treatment Plan and Discharge Planning: Lidna Galindo is aware of and agrees to continue to work on their treatment plan. They have identified and are working toward their discharge goals. yes    Depression Follow-up Plan Completed: No    Visit start and stop times:    05/14/25  Start Time: 1300  Stop Time: 1350  Total Visit Time: 50 minutes

## 2025-06-09 ENCOUNTER — TELEPHONE (OUTPATIENT)
Dept: PSYCHIATRY | Facility: CLINIC | Age: 55
End: 2025-06-09

## 2025-06-09 NOTE — TELEPHONE ENCOUNTER
Called patient and she is aware that appointment today with Nini Mendoza LCSW is cancelled due to provider out of office.  Patient doesn't want to reschedule.  Confirmed her next appointment on 6/23/2025.

## 2025-06-23 ENCOUNTER — SOCIAL WORK (OUTPATIENT)
Dept: BEHAVIORAL/MENTAL HEALTH CLINIC | Facility: CLINIC | Age: 55
End: 2025-06-23
Payer: COMMERCIAL

## 2025-06-23 DIAGNOSIS — F33.1 MAJOR DEPRESSIVE DISORDER, RECURRENT EPISODE, MODERATE (HCC): Primary | ICD-10-CM

## 2025-06-23 PROCEDURE — 90834 PSYTX W PT 45 MINUTES: CPT | Performed by: SOCIAL WORKER

## 2025-06-23 NOTE — PSYCH
"Behavioral Health Psychotherapy Progress Note    Psychotherapy Provided: Individual Psychotherapy     1. Major depressive disorder, recurrent episode, moderate (HCC)            Goals addressed in session: Goal 1     DATA: Linda reports feeling heavy overall stating that her co worker's son  tragically a few weeks ago while doing a \"[employer] challenge\" wherein he was being dragged by a car while riding on a table. Discussed how this has wrecked her community and weighs heavy on her because her co workers is obviously struggling. Recognized ways in which she is minimizing her experience because she doesn't \"understand why I am taking this so had, I'm not a parent\"  and responded positively to this writer pointing it out. Assisted in allowing her to connect with her emotions as they are in real time and journal her experiences. Disucssed durga [place]in[place] of this in terms of containment which she was understanding to.   During this session, this clinician used the following therapeutic modalities: Client-centered Therapy    Substance Abuse was not addressed during this session. If the client is diagnosed with a co-occurring substance use disorder, please indicate any changes in the frequency or amount of use: NA. Stage of change for addressing substance use diagnoses: No substance use/Not applicable    ASSESSMENT:  Linda Galindo presents with a Euthymic/ normal mood.     her affect is Normal range and intensity, which is congruent, with her mood and the content of the session. The client has made progress on their goals.    During this session the client was oriented to person, place, and time. The client was engaged in the session. The client was able to sustain direct eye contact and was without psychomotor agitation. The client's thought processes were linear and clear.   Linda Galindo presents with a none risk of suicide, none risk of self-harm, and none risk of harm to others.    For any risk assessment that " "surpasses a \"low\" rating, a safety plan must be developed.    A safety plan was indicated: no  If yes, describe in detail NA    PLAN: Between sessions, Linda Galindo will take medication as prescribed and practice positive coping strategies as needed . At the next session, the therapist will use Client-centered Therapy to address stressors.    Behavioral Health Treatment Plan and Discharge Planning: Linda Galindo is aware of and agrees to continue to work on their treatment plan. They have identified and are working toward their discharge goals. yes    Depression Follow-up Plan Completed: No    Visit start and stop times:    06/23/25  Start Time: 1300  Stop Time: 1350  Total Visit Time: 50 minutes  "

## 2025-07-11 ENCOUNTER — TELEMEDICINE (OUTPATIENT)
Dept: BEHAVIORAL/MENTAL HEALTH CLINIC | Facility: CLINIC | Age: 55
End: 2025-07-11
Payer: COMMERCIAL

## 2025-07-11 DIAGNOSIS — F33.1 MAJOR DEPRESSIVE DISORDER, RECURRENT EPISODE, MODERATE (HCC): Primary | ICD-10-CM

## 2025-07-11 PROCEDURE — 90837 PSYTX W PT 60 MINUTES: CPT | Performed by: SOCIAL WORKER

## 2025-07-11 NOTE — PSYCH
"Virtual Regular VisitName: Linda Galindo      : 1970      MRN: [id number]  Encounter Provider: Nini Mendoza LCSW  Encounter Date: 2025   Encounter department: [facility]  :  Assessment & Plan  Major depressive disorder, recurrent episode, moderate (HCC)             Goals addressed in session: Goal 1     DATA: Linda reports feeling ok stating that she and her co workers are still processing their friend's son's death and trying to come together as a group to help support her. Acknowledged ways in which they have all been triggered in some way and how they have been mindful of their own feelings and experiences. Will continue to work on observing thoughts and feelings from a distance. No safety concerns. Return in 2 weeks.   During this session, this clinician used the following therapeutic modalities: Cognitive Processing Therapy    Substance Abuse was not addressed during this session. If the client is diagnosed with a co-occurring substance use disorder, please indicate any changes in the frequency or amount of use: NA. Stage of change for addressing substance use diagnoses: No substance use/Not applicable    ASSESSMENT:  Linda presents with a Euthymic/ normal mood. Linda's affect is Normal range and intensity, which is congruent, with their mood and the content of the session. The client has made progress on their goals as evidenced by improved insight and improved mood.    Linda presents with a none risk of suicide, none risk of self-harm, and none risk of harm to others.    For any risk assessment that surpasses a \"low\" rating, a safety plan must be developed.    A safety plan was indicated: no  If yes, describe in detail NA    PLAN: Between sessions, Linda will take medication as prescribed and practice positive coping strategies as needed . At the next session, the therapist will use Cognitive Processing Therapy to address stressors.    Behavioral " Health Treatment Plan [facility]: Diagnosis and Treatment Plan explained to Linda, Linda relates understanding diagnosis and is agreeable to Treatment Plan. Yes     Depression Follow-up Plan Completed: No     Reason for visit is No chief complaint on file.     Recent Visits  No visits were found meeting these conditions.  Showing recent visits within past 7 days and meeting all other requirements  Today's Visits  Date Type Provider Dept   07/11/25 Telemedicine Nini Mendoza LCSW [facility]   Showing today's visits and meeting all other requirements  Future Appointments  No visits were found meeting these conditions.  Showing future appointments within next 150 days and meeting all other requirements     History of Present Illness     HPI    Past Medical History   Past Medical History[1]  Past Surgical History[2]  Current Outpatient Medications   Medication Instructions    5-Hydroxytryptophan 100 MG CAPS 5- mg capsule   Take by oral route.    amitriptyline (ELAVIL) 10 mg, Daily at bedtime    amoxicillin (AMOXIL) 500 mg capsule TAKE 2 CAPSULES BY MOUTH AFTER SURGERY AND THEN 1 CAPSULE 3 TIMES A DAY UNTIL FINISHED.    Ascorbic Acid (VITAMIN C) 100 MG CHEW Vitamin C    Black Cohosh-Flaxseed-Soy ER -100 MG TBCR Oral, Daily    Calcium 500-100 MG-UNIT CHEW 600 mg, Daily    Cholecalciferol (VITAMIN D3) 5000 units CAPS Vitamin D3    coenzyme Q-10 100 MG capsule Co Q-10    Coenzyme Q10 10 MG capsule Co Q-10    Cyanocobalamin 1000 MCG LOZG cyanocobalamin (vit B-12) 1,000 mcg sublingual lozenge   Place by sublingual route.    cyclobenzaprine (FLEXERIL) 10 mg tablet     desvenlafaxine succinate (PRISTIQ) 50 mg 24 hr tablet Daily    desvenlafaxine succinate (PRISTIQ) 50 mg, Oral, Daily    DULoxetine (CYMBALTA) 30 mg, Daily    DULoxetine (CYMBALTA) 20 mg, 2 times daily    escitalopram (LEXAPRO) 20 mg tablet     estradiol (ESTRACE) 1 mg, Oral, Daily    Evening Primrose Oil 500 MG CAPS      hydrOXYzine HCL (ATARAX) 25 mg, 3 times daily    Magnesium 100 MG CAPS magnesium    MAGNESIUM PO Daily    Multiple Vitamin (MULTI-VITAMIN DAILY PO) Multi Vitamin    Na Sulfate-K Sulfate-Mg Sulf (Suprep Bowel Prep Kit) 17.5-3.13-1.6 GM/177ML SOLN Take as directed by office    Nutritional Supplements (ESTROVEN PO) Oral, Daily    Omega-3 Fatty Acids (Fish Oil) 500 MG CAPS     Phosphatidylserine 100 MG CAPS phosphatidylserine   take one daily    Probiotic Product (PROBIOTIC PO) Oral, Daily    S-Acetylglutathione 100 MG CPDR S-acetylglutathione   Two capsules three times weekly    sertraline (ZOLOFT) 50 mg, Oral, Daily    sertraline (ZOLOFT) 100 mg, Daily    Tirzepatide (Mounjaro) 2.5 MG/0.5ML SOAJ     Turmeric 500 MG CAPS     valACYclovir (VALTREX) 500 mg, Oral, Daily    VITAMIN B COMPLEX-C PO     Vitamin C 1,000 mg, Daily    Zepbound 5 mg, Weekly    Zinc 50 MG TABS zinc 50 mg tablet   once a day    zolpidem (AMBIEN) 5 mg tablet Take 1 tablet as needed by oral route at bedtime.     Allergies[3]    Objective   There were no vitals taken for this visit.    Video Exam  Physical Exam     Administrative Statements   Encounter provider Nini Mendoza LCSW    The Patient is located at Home and in the following state in which I hold an active license [place].    The patient was identified by name and date of birth. Linda Galindo was informed that this is a telemedicine visit and that the visit is being conducted through the Epic Embedded platform. She agrees to proceed..  My office door was closed. No one else was in the room.  She acknowledged consent and understanding of privacy and security of the video platform. The patient has agreed to participate and understands they can discontinue the visit at any time.        Visit Time  Start Time: 1200  Stop Time: 1300  Total Visit Time: 60 minutes       [1]   Past Medical History:  Diagnosis Date    Abnormal Pap smear of cervix     Anxiety     BRCA1 negative     BRCA2 negative      Depression     Headache(784.0) 1985    Herpes 2008    Sleep difficulties    [2]   Past Surgical History:  Procedure Laterality Date    COLPOSCOPY      DENTAL SURGERY  1991    [place] teeth    EYE SURGERY  1996    Lasik    HYSTERECTOMY  2012    OOPHORECTOMY Left 2012    one ovary remaining   [3]   Allergies  Allergen Reactions    Sulfamethoxazole-Trimethoprim Hives

## 2025-07-21 ENCOUNTER — SOCIAL WORK (OUTPATIENT)
Dept: BEHAVIORAL/MENTAL HEALTH CLINIC | Facility: CLINIC | Age: 55
End: 2025-07-21
Payer: COMMERCIAL

## 2025-07-21 DIAGNOSIS — F33.1 MAJOR DEPRESSIVE DISORDER, RECURRENT EPISODE, MODERATE (HCC): Primary | ICD-10-CM

## 2025-07-21 PROCEDURE — 90837 PSYTX W PT 60 MINUTES: CPT | Performed by: SOCIAL WORKER

## 2025-08-14 ENCOUNTER — TELEPHONE (OUTPATIENT)
Dept: PSYCHIATRY | Facility: CLINIC | Age: 55
End: 2025-08-14

## 2025-08-14 ENCOUNTER — HOSPITAL ENCOUNTER (OUTPATIENT)
Dept: MAMMOGRAPHY | Facility: HOSPITAL | Age: 55
Discharge: HOME | End: 2025-08-14
Attending: OBSTETRICS & GYNECOLOGY
Payer: COMMERCIAL